# Patient Record
Sex: FEMALE | Race: WHITE | Employment: STUDENT | URBAN - METROPOLITAN AREA
[De-identification: names, ages, dates, MRNs, and addresses within clinical notes are randomized per-mention and may not be internally consistent; named-entity substitution may affect disease eponyms.]

---

## 2017-06-22 ENCOUNTER — HOSPITAL ENCOUNTER (OUTPATIENT)
Dept: PHYSICAL THERAPY | Age: 22
Discharge: HOME | End: 2017-06-22
Payer: COMMERCIAL

## 2017-06-22 PROCEDURE — 97162 PT EVAL MOD COMPLEX 30 MIN: CPT

## 2017-06-22 PROCEDURE — 97110 THERAPEUTIC EXERCISES: CPT

## 2017-06-22 NOTE — PROGRESS NOTES
Ambulatory/Rehab Services H2 Model Falls Risk Assessment    Risk Factor Pts. ·   Confusion/Disorientation/Impulsivity  []    4 ·   Symptomatic Depression  []   2 ·   Altered Elimination  []   1 ·   Dizziness/Vertigo  []   1 ·   Gender (Male)  []   1 ·   Any administered antiepileptics (anticonvulsants):  []   2 ·   Any administered benzodiazepines:  []   1 ·   Visual Impairment (specify):  []   1 ·   Portable Oxygen Use  []   1 ·   Orthostatic ? BP  []   1 ·   History of Recent Falls (within 3 mos.)  []   5     Ability to Rise from Chair (choose one) Pts. ·   Ability to rise in a single movement  []   0 ·   Pushes up, successful in one attempt  []   1 ·   Multiple attempts, but successful  []   3 ·   Unable to rise without assistance  []   4   Total: (5 or greater = High Risk) 0     Falls Prevention Plan:   []                Physical Limitations to Exercise (specify):   []                Mobility Assistance Device (type):   []                Exercise/Equipment Adaptation (specify):    ©2010 [facility] [address] Patent #[id number].  Federal Law prohibits the replication, distribution or use without written permission from [facility] [employer]

## 2017-06-22 NOTE — PROGRESS NOTES
Dell De La Rosa  : 1995 Therapy Center at [facility]  [address], [facility], [address]  [phone]   [phone]          OUTPATIENT PHYSICAL THERAPY:Initial Assessment 2017    ICD-10: Treatment Diagnosis: M54.5  Low back pain  M25.552  L hip pain  Precautions/Allergies:   Review of patient's allergies indicates no known allergies. Fall Risk Score: 0 (? 5 = High Risk)  MD Orders: Eval and treat MEDICAL/REFERRING DIAGNOSIS:  Pain in left hip [M25.552]   DATE OF ONSET: about 1 month ago  REFERRING PHYSICIAN: Ruba Davenport MD  RETURN PHYSICIAN APPOINTMENT: tomorrow     INITIAL ASSESSMENT:  Ms. Rajani Turcios presents with pain in the L hip and postural asymmetry that is consistent the a lateral shift in the lumbar spine. Her pain is not due to any specific trauma, but she is a competitive golfer. Xray was negative for any significant finding; pt has MRI scheduled for tomorrow. She responded well to initial PT treatment to correct postural alignment. She is expected to continue to improve with postural correction to allow stabilization, and progression to exercise program to restore normal posture, strength and motion, without pain. She is well motivated. PROBLEM LIST (Impacting functional limitations):  1. Decreased ADL/Functional Activities  2. Increased Pain  3. Decreased Activity Tolerance  4. Decreased Flexibility/Joint Mobility  5. Decreased [place] with Home Exercise Program  6. INTERVENTIONS PLANNED:  1. Home Exercise Program (HEP)  2. Manual Therapy  3. Range of Motion (ROM)  4. Therapeutic Exercise/Strengthening  5. Modalities as appropriate, including traction   TREATMENT PLAN:  Effective Dates: 2017 TO 9-. Frequency/Duration: 3 times a week for 1 week, then 2 x per week, and upon reassessment will adjust frequency and duration as progress indicates.     GOALS: (Goals have been discussed and agreed upon with patient.)    SHORT-TERM FUNCTIONAL GOALS: Time Frame: 4 weeks  Pt able to maintain good postural correction   Decrease pain by 3/10 to improve sitting and standing tolerance  [place] in HEP with minimal cueing, and aware of precautions for exercise  Be able to demonstrate correct sitting posture and protective body mechanics to minimize spinal stress  DISCHARGE GOALS: Time Frame: 12 weeks  Decrease pain to <3/10 to allow return to golf  Improve trunk AROM to WNL for bending & lifting ADL's  Improve score on modified Oswestry Index by > 5 points to allow progression to independent program  Progression to advanced HEP with no cueing to allow discharge from therapy. Rehabilitation Potential For Stated Goals: Good  Regarding [facility]'s therapy, I certify that the treatment plan above will be carried out by a therapist or under their direction. Thank you for this referral,  Delilah Orta PT     Referring Physician Signature: Queta Garcia MD              Date                    The information in this section was collected on 6- (except where otherwise noted). 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 Pain initially started on the R, with a familiar tightening of the lumbar paraspinals. Then pain moved to the L and has settled in the hip. She noticed that her trunk was out of alignment when the pain shifted to the hip. Nothing she has tried has helped the alignment or the pain. She reports her pain is constant, but she is painfree in lying down. Pain is made worse with moderate to prolonged sitting, rising, bending, in the am and as the day progresses. It is painful but less so with walking, or standing. Pain does not radiate below the buttock, and does not disturb her sleep.      Past Medical History/Comorbidities:   Ms. Duong Never  has no past medical history on file. Ms. Shima Valles  has a past surgical history that includes orthopaedic. She had L shoulder surgery about 7 yrs ago for a basketball injury. Social History/Living Environment:     lives with parents. Is sophomore at [employer],  Prior Level of Function/Work/Activity:  Full time student. On the golf team.  Active with exercise and sport  Dominant Side:         RIGHT  Personal Factors:          Age:  22 y.o. Current Medications:       Current Outpatient Prescriptions:     levalbuterol tartrate (XOPENEX HFA) 45 mcg/Actuation inhaler, Take  by inhalation. , Disp: , Rfl:    Date Last Reviewed:  6/22/2017     Number of Personal Factors/Comorbidities that affect the Plan of Care: 1-2: MODERATE COMPLEXITY   EXAMINATION:   Observation/Orthostatic Postural Assessment:          Pt is slightly round shouldered, with mildly forward head. In static standing shoulders are shifted to the L. Pt cannot correct posture in standing, nor can she maintain it if manually corrected. ROM:                Standing flexion= Moderately limited. Extn= Major limitation. Sideglide R= Major limitation. Sideglide L= nil limitation  Strength:          Weakness L DF and EHL -(4/5)  Compared to R  (5/5)  Neurological Screen:        Neural Tension Tests:  Positive on L        Sensation=  NT   Pt reports she had some tingling in L thigh about 3 weeks ago, but it has gone       Body Structures Involved:  1. Nerves  2. Bones  3. Joints  4. Muscles  5. Ligaments Body Functions Affected:  1. Sensory/Pain  2. Neuromusculoskeletal  3. Movement Related Activities and Participation Affected:  1. General Tasks and Demands  2. Mobility  3. Self Care  4. Domestic Life  5. Interpersonal Interactions and Relationships  6.  Community, Social and [place]   Number of elements that affect the Plan of Care: 3: MODERATE COMPLEXITY   CLINICAL PRESENTATION:   Presentation: Evolving clinical presentation with changing clinical characteristics: MODERATE COMPLEXITY   CLINICAL DECISION MAKING:   Outcome Measure: Tool Used: Modified Oswestry Low Back Pain Questionnaire  Score:  Initial: 26/50  Most Recent: X/50 (Date: -- )   Interpretation of Score: Each section is scored on a 0-5 scale, 5 representing the greatest disability. The scores of each section are added together for a total score of 50. Score 0 1-10 11-20 21-30 31-40 41-49 50   Modifier CH CI CJ CK CL CM CN     Medical Necessity:   · Skilled intervention continues to be required due to low back and hip pain that interferes with mobility, comfort, leisure. Reason for Services/Other Comments:  · Patient continues to require skilled intervention due to the need for specific exercise progression to restore normal alignment and promote return to previous functional level. .   Use of outcome tool(s) and clinical judgement create a POC that gives a: Questionable prediction of patient's progress: MODERATE COMPLEXITY            TREATMENT:   (In addition to Assessment/Re-Assessment sessions the following treatments were rendered)  Pre-treatment Symptoms/Complaints:  Pt has pain in the L hip, and her trunk is out of alignment and she can't correct it. Pain: Initial:  Pain Intensity 1: 5  Post Session:  4     Evaluation  (X)    THERAPEUTIC EXERCISE: (20 minutes):  Exercises  to improve mobility, strength and coordination. Required verbal and physical  cues to promote proper body alignment. Progressed resistance, range and repetitions as indicated. Pt worked on correction of lateral shift. Had manual correction in standing, with manual correction by therapist.  Was able to correct alignment but pt not able to sustain it. Worked on self correction leaning into the wall. .  Also tried self correction with pt overpressure but this is not effective for her to reduce the lateral sift.   Did prone exnt with hips off centre, to the L---pt able to eventually get elbows fully extended, and add overpressure. No pain in the hip with this exercise. Returned to standing and had pt doing lateral shift in doorway, to stabilize shoulders. This is the most effective way for her. She was able to get to slightly over-corrected posture, but still not able to maintain correction. After several sets in door way, lateral shift was visibly better but not fully corrected. Retested MMT following lateral shift ex--L DF improve 1 grade. Treatment/Session Assessment:  Pt tolerated treatment well today. She has been frustrated by the lack of change in her hip with time and with chiropractic, and seems to think now that the hip is not the source of the pain. She was pleased to see the decrease in pain and the change in her posture and DF strength after lots of repeated reps of L sideglide. She is very well motivated to be able to return to golf. Is expected to continue to improve, to benefit from PT.     · Response to Treatment:  Decrease in pain and decrease in the severity of the L lateral shift. · Compliance with Program/Exercises: Will assess as treatment progresses. · Recommendations/Intent for next treatment session:   Next visit will focus on decreasing pain, improving AROM,  Strength, and movement patterns, to restore optimal/normal function.     Total Treatment Duration:   60 min    PT Patient Time In/Time Out  Time In: 0100  Time Out: 0200    Total number of treatments:   MILNEA Roca

## 2017-06-26 ENCOUNTER — HOSPITAL ENCOUNTER (OUTPATIENT)
Dept: PHYSICAL THERAPY | Age: 22
Discharge: HOME | End: 2017-06-26
Payer: COMMERCIAL

## 2017-06-26 PROCEDURE — 97110 THERAPEUTIC EXERCISES: CPT

## 2017-06-26 PROCEDURE — 97012 MECHANICAL TRACTION THERAPY: CPT

## 2017-06-26 NOTE — PROGRESS NOTES
Martha Lindo  : 1995 Therapy Center at [facility] Therapy  [address], [facility], [address]  [phone]   [id number] [id number]          OUTPATIENT PHYSICAL THERAPY: DAILY NOTE  2017    ICD-10: Treatment Diagnosis: M54.5  Low back pain  M25.552  L hip pain  Precautions/Allergies:   Review of patient's allergies indicates no known allergies. Fall Risk Score: 0 (? 5 = High Risk)  MD Orders: Eval and treat MEDICAL/REFERRING DIAGNOSIS:  Pain in left hip [M25.552]   DATE OF ONSET: about 1 month ago  REFERRING PHYSICIAN: Madeline Martínez MD  RETURN PHYSICIAN APPOINTMENT: tomorrow     INITIAL ASSESSMENT:  Ms. Donte Das presents with pain in the L hip and postural asymmetry that is consistent the a lateral shift in the lumbar spine. Her pain is not due to any specific trauma, but she is a competitive golfer. Xray was negative for any significant finding; pt has MRI scheduled for tomorrow. She responded well to initial PT treatment to correct postural alignment. She is expected to continue to improve with postural correction to allow stabilization, and progression to exercise program to restore normal posture, strength and motion, without pain. She is well motivated. PROBLEM LIST (Impacting functional limitations):  1. Decreased ADL/Functional Activities  2. Increased Pain  3. Decreased Activity Tolerance  4. Decreased Flexibility/Joint Mobility  5. Decreased [place] with Home Exercise Program  6. INTERVENTIONS PLANNED:  1. Home Exercise Program (HEP)  2. Manual Therapy  3. Range of Motion (ROM)  4. Therapeutic Exercise/Strengthening  5. Modalities as appropriate, including traction   TREATMENT PLAN:  Effective Dates: 2017 TO 9-. Frequency/Duration: 3 times a week for 1 week, then 2 x per week, and upon reassessment will adjust frequency and duration as progress indicates.     GOALS: (Goals have been discussed and agreed upon with patient.)    SHORT-TERM FUNCTIONAL GOALS: Time Frame: 4 weeks  Pt able to maintain good postural correction   Decrease pain by 3/10 to improve sitting and standing tolerance  [place] in HEP with minimal cueing, and aware of precautions for exercise  Be able to demonstrate correct sitting posture and protective body mechanics to minimize spinal stress  DISCHARGE GOALS: Time Frame: 12 weeks  Decrease pain to <3/10 to allow return to golf  Improve trunk AROM to WNL for bending & lifting ADL's  Improve score on modified Oswestry Index by > 5 points to allow progression to independent program  Progression to advanced HEP with no cueing to allow discharge from therapy. Rehabilitation Potential For Stated Goals: Good  Regarding [facility]'s therapy, I certify that the treatment plan above will be carried out by a therapist or under their direction. Thank you for this referral,  Scotty Soni PT     Referring Physician Signature: Ye Joseph MD              Date                    The information in this section was collected on 6- (except where otherwise noted). 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 Pain initially started on the R, with a familiar tightening of the lumbar paraspinals. Then pain moved to the L and has settled in the hip. She noticed that her trunk was out of alignment when the pain shifted to the hip. Nothing she has tried has helped the alignment or the pain. She reports her pain is constant, but she is painfree in lying down. Pain is made worse with moderate to prolonged sitting, rising, bending, in the am and as the day progresses. It is painful but less so with walking, or standing. Pain does not radiate below the buttock, and does not disturb her sleep.      Past Medical History/Comorbidities:   Ms. Li Lu  has no past medical history on file. Ms. Arely Raza  has a past surgical history that includes orthopaedic. She had L shoulder surgery about 7 yrs ago for a basketball injury. Social History/Living Environment:     lives with parents. 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 Current Medications:       Current Outpatient Prescriptions:     levalbuterol tartrate (XOPENEX HFA) 45 mcg/Actuation inhaler, Take  by inhalation. , Disp: , Rfl:    Date Last Reviewed:  6/26/2017     Number of Personal Factors/Comorbidities that affect the Plan of Care: 1-2: MODERATE COMPLEXITY   EXAMINATION:   Observation/Orthostatic Postural Assessment:          Pt is slightly round shouldered, with mildly forward head. In static standing shoulders are shifted to the L. Pt cannot correct posture in standing, nor can she maintain it if manually corrected. ROM:                Standing flexion= Moderately limited. Extn= Major limitation. Sideglide R= Major limitation. Sideglide L= nil limitation  Strength:          Weakness L DF and EHL -(4/5)  Compared to R  (5/5)  Neurological Screen:        Neural Tension Tests:  Positive on L        Sensation=  NT   Pt reports she had some tingling in L thigh about 3 weeks ago, but it has gone       Body Structures Involved:  1. Nerves  2. Bones  3. Joints  4. Muscles  5. Ligaments Body Functions Affected:  1. Sensory/Pain  2. Neuromusculoskeletal  3. Movement Related Activities and Participation Affected:  1. General Tasks and Demands  2. Mobility  3. Self Care  4. Domestic Life  5. Interpersonal Interactions and Relationships  6.  Community, Social and [place]   Number of elements that affect the Plan of Care: 3: MODERATE COMPLEXITY   CLINICAL PRESENTATION:   Presentation: Evolving clinical presentation with changing clinical characteristics: MODERATE COMPLEXITY   CLINICAL DECISION MAKING:   Outcome Measure: Tool Used: Modified Oswestry Low Back Pain Questionnaire  Score:  Initial: 26/50  Most Recent: X/50 (Date: -- )   Interpretation of Score: Each section is scored on a 0-5 scale, 5 representing the greatest disability. The scores of each section are added together for a total score of 50. Score 0 1-10 11-20 21-30 31-40 41-49 50   Modifier CH CI CJ CK CL CM CN     Medical Necessity:   · Skilled intervention continues to be required due to low back and hip pain that interferes with mobility, comfort, leisure. Reason for Services/Other Comments:  · Patient continues to require skilled intervention due to the need for specific exercise progression to restore normal alignment and promote return to previous functional level. .   Use of outcome tool(s) and clinical judgement create a POC that gives a: Questionable prediction of patient's progress: MODERATE COMPLEXITY            TREATMENT:   (In addition to Assessment/Re-Assessment sessions the following treatments were rendered)    Pre-treatment Symptoms/Complaints:  Pt had MRI last week and got results back this morning. Imaging confirmed herniated disc at L5, and bulges at L4 and L3. She is scheduled to get cortisone injection later this week. She reported that she has been compliant with lateral shift exercises, and she may be a little bit better. Pain: Initial:  Pain Intensity 1: 4  Post Session:  3         THERAPEUTIC EXERCISE: (45 minutes):  Exercises  to improve mobility, strength and coordination. Required verbal and physical  cues to promote proper body alignment. Progressed resistance, range and repetitions as indicated. Pt worked on lots of correction of lateral shift. Had manual correction in standing, with manual correction by therapist.  Was able to correct alignment but pt not able to sustain it.    Did prone exnt with hips off centre, to the R---pt able to eventually get elbows fully extended, and add overpressure. No pain in the hip with this exercise. Returned to standing and had pt doing lateral shift in doorway, to stabilize shoulders. This is the most effective way for her. She was able to get to slightly over-corrected posture, but still not able to maintain correction. alternated between sets of manual overcorrection by therapist in standing, and prone press ups, now with hips centered. Pt able to get good extn range without sx in prone. By the time she left she could stand and walk with much better symmetry. Left HEP the same--lateral shift in doorway, followed by prone press ups        Modalities  (15 min): Intermittent lumbar traction at 75#, x 15 min, to provide axial stretching and lumbar decompression    Treatment/Session Assessment:  Pt tolerated treatment well today. She remains frustrated by the lack of change in her hip with time and with chiropractic. Was going to try inversion table today following PT. She was pleased to see the decrease in pain and the improvement in her posture after lots of repeated reps of L sideglide. She is very well motivated to be able to return to golf. Is expected to continue to improve, to benefit from PT.     · Response to Treatment:  Decrease in pain and decrease in the severity of the L lateral shift. · Compliance with Program/Exercises: Will assess as treatment progresses. · Recommendations/Intent for next treatment session:   Next visit will focus on decreasing pain, improving AROM,  Strength, and movement patterns, to restore optimal/normal function.     Total Treatment Duration:   60 min    PT Patient Time In/Time Out  Time In: 0400  Time Out: 0500    Total number of treatments:   2    Meli Dougherty, PT

## 2017-06-28 ENCOUNTER — HOSPITAL ENCOUNTER (OUTPATIENT)
Dept: PHYSICAL THERAPY | Age: 22
Discharge: HOME | End: 2017-06-28
Payer: COMMERCIAL

## 2017-06-28 PROCEDURE — 97110 THERAPEUTIC EXERCISES: CPT

## 2017-06-28 NOTE — PROGRESS NOTES
Ysabel Rashad  : 1995 Therapy [facility] at [facility]  [address], [facility], [address]  [phone]   [phone]          OUTPATIENT PHYSICAL THERAPY: DAILY NOTE  2017    ICD-10: Treatment Diagnosis: M54.5  Low back pain  M25.552  L hip pain  Precautions/Allergies:   Review of patient's allergies indicates no known allergies. Fall Risk Score: 0 (? 5 = High Risk)  MD Orders: Eval and treat MEDICAL/REFERRING DIAGNOSIS:  Pain in left hip [M25.552]   DATE OF ONSET: about 1 month ago  REFERRING PHYSICIAN: Sunita Clements MD  RETURN PHYSICIAN APPOINTMENT: tomorrow     INITIAL ASSESSMENT:  Ms. Bharat Conner presents with pain in the L hip and postural asymmetry that is consistent the a lateral shift in the lumbar spine. Her pain is not due to any specific trauma, but she is a competitive golfer. Xray was negative for any significant finding; pt has MRI scheduled for tomorrow. She responded well to initial PT treatment to correct postural alignment. She is expected to continue to improve with postural correction to allow stabilization, and progression to exercise program to restore normal posture, strength and motion, without pain. She is well motivated. PROBLEM LIST (Impacting functional limitations):  1. Decreased ADL/Functional Activities  2. Increased Pain  3. Decreased Activity Tolerance  4. Decreased Flexibility/Joint Mobility  5. Decreased [place] with Home Exercise Program  6. INTERVENTIONS PLANNED:  1. Home Exercise Program (HEP)  2. Manual Therapy  3. Range of Motion (ROM)  4. Therapeutic Exercise/Strengthening  5. Modalities as appropriate, including traction   TREATMENT PLAN:  Effective Dates: 2017 TO 9-. Frequency/Duration: 3 times a week for 1 week, then 2 x per week, and upon reassessment will adjust frequency and duration as progress indicates.     GOALS: (Goals have been discussed and agreed upon with patient.)    SHORT-TERM FUNCTIONAL GOALS: Time Frame: 4 weeks  Pt able to maintain good postural correction   Decrease pain by 3/10 to improve sitting and standing tolerance  [place] in HEP with minimal cueing, and aware of precautions for exercise  Be able to demonstrate correct sitting posture and protective body mechanics to minimize spinal stress  DISCHARGE GOALS: Time Frame: 12 weeks  Decrease pain to <3/10 to allow return to golf  Improve trunk AROM to WNL for bending & lifting ADL's  Improve score on modified Oswestry Index by > 5 points to allow progression to independent program  Progression to advanced HEP with no cueing to allow discharge from therapy. Rehabilitation Potential For Stated Goals: Good  Regarding [facility]'s therapy, I certify that the treatment plan above will be carried out by a therapist or under their direction. Thank you for this referral,  Lamar Renee, PT     Referring Physician Signature: Trish Pickett MD              Date                    The information in this section was collected on 6- (except where otherwise noted). 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 Pain initially started on the R, with a familiar tightening of the lumbar paraspinals. Then pain moved to the L and has settled in the hip. She noticed that her trunk was out of alignment when the pain shifted to the hip. Nothing she has tried has helped the alignment or the pain. She reports her pain is constant, but she is painfree in lying down. Pain is made worse with moderate to prolonged sitting, rising, bending, in the am and as the day progresses. It is painful but less so with walking, or standing. Pain does not radiate below the buttock, and does not disturb her sleep.      Past Medical History/Comorbidities:   Ms. Nyasia Moseley  has no past medical history on file. Ms. Stefany Stewart  has a past surgical history that includes orthopaedic. She had L shoulder surgery about 7 yrs ago for a basketball injury. Social History/Living Environment:     lives with parents. Is sophomore at [employer],  Prior Level of Function/Work/Activity:  Full time student. On the golf team.  Active with exercise and sport  Dominant Side:         RIGHT  Personal Factors:          Age:  22 y.o. Current Medications:       Current Outpatient Prescriptions:     levalbuterol tartrate (XOPENEX HFA) 45 mcg/Actuation inhaler, Take  by inhalation. , Disp: , Rfl:    Date Last Reviewed:  6/28/2017     Number of Personal Factors/Comorbidities that affect the Plan of Care: 1-2: MODERATE COMPLEXITY   EXAMINATION:   Observation/Orthostatic Postural Assessment:          Pt is slightly round shouldered, with mildly forward head. In static standing shoulders are shifted to the L. Pt cannot correct posture in standing, nor can she maintain it if manually corrected. ROM:                Standing flexion= Moderately limited. Extn= Major limitation. Sideglide R= Major limitation. Sideglide L= nil limitation  Strength:          Weakness L DF and EHL -(4/5)  Compared to R  (5/5)  Neurological Screen:        Neural Tension Tests:  Positive on L        Sensation=  NT   Pt reports she had some tingling in L thigh about 3 weeks ago, but it has gone       Body Structures Involved:  1. Nerves  2. Bones  3. Joints  4. Muscles  5. Ligaments Body Functions Affected:  1. Sensory/Pain  2. Neuromusculoskeletal  3. Movement Related Activities and Participation Affected:  1. General Tasks and Demands  2. Mobility  3. Self Care  4. Domestic Life  5. Interpersonal Interactions and Relationships  6.  Community, Social and [place]   Number of elements that affect the Plan of Care: 3: MODERATE COMPLEXITY   CLINICAL PRESENTATION:   Presentation: Evolving clinical presentation with changing clinical characteristics: MODERATE COMPLEXITY   CLINICAL DECISION MAKING:   Outcome Measure: Tool Used: Modified Oswestry Low Back Pain Questionnaire  Score:  Initial: 26/50  Most Recent: X/50 (Date: -- )   Interpretation of Score: Each section is scored on a 0-5 scale, 5 representing the greatest disability. The scores of each section are added together for a total score of 50. Score 0 1-10 11-20 21-30 31-40 41-49 50   Modifier CH CI CJ CK CL CM CN     Medical Necessity:   · Skilled intervention continues to be required due to low back and hip pain that interferes with mobility, comfort, leisure. Reason for Services/Other Comments:  · Patient continues to require skilled intervention due to the need for specific exercise progression to restore normal alignment and promote return to previous functional level. .   Use of outcome tool(s) and clinical judgement create a POC that gives a: Questionable prediction of patient's progress: MODERATE COMPLEXITY            TREATMENT:   (In addition to Assessment/Re-Assessment sessions the following treatments were rendered)    Pre-treatment Symptoms/Complaints:  Pt reported that she is feeling some better. She has been compliant with HEP, and can now get hips to the L. Is going for DEBORAH on 7-3. Pain: Initial:  Pain Intensity 1: 3  Post Session:  3         THERAPEUTIC EXERCISE: (60 minutes):  Exercises  to improve mobility, strength and coordination. Required verbal and physical  cues to promote proper body alignment. Progressed resistance, range and repetitions as indicated. Rechecked motion baselines--flexion has improved to minimal limitation. Exten still moderately to severely limited. Pt can now get to L sideglide without asst.  Pt worked on lots of correction of lateral shift. did manual correction in standing, with manual correction by therapist.  Was able to correct alignment with minimal pressure.      Did prone exnt ---pt able to get elbows fully extended, and add overpressure. No pain in the hip with this exercise. alternated between sets of manual overcorrection by therapist in standing, and prone press ups. Pt able to get good extn range without sx in prone. By the time she left she could stand and walk with much better symmetry. Left HEP the same--lateral shift in doorway, followed by prone press ups    Visual feedback for walking in the glass--pt able to maintain almost symmetrical spine following manual correction  Pt able to single leg stand without compensation or increase in pain  After treatment pt reported no pain in the L leg or buttock --low back pain was centralised    Modalities  ( min):      Treatment/Session Assessment:  Pt tolerated treatment well today. She was pleased to see the decrease in pain and the improvement in her posture after lots of repeated reps of L sideglide. Her active standing motion is much better. She is very well motivated to be able to return to golf. Is expected to continue to improve, to benefit from PT. She gets DEBORAH on 7-3. · Response to Treatment:  Decrease in pain and much improved static standing posture  · Compliance with Program/Exercises: Will assess as treatment progresses. · Recommendations/Intent for next treatment session:   Next visit will focus on decreasing pain, improving AROM,  Strength, and movement patterns, to restore optimal/normal function.     Total Treatment Duration:   60 min    PT Patient Time In/Time Out  Time In: 1055  Time Out: 1155    Total number of treatments:  [address], PT

## 2017-06-29 ENCOUNTER — HOSPITAL ENCOUNTER (OUTPATIENT)
Dept: PHYSICAL THERAPY | Age: 22
Discharge: HOME | End: 2017-06-29
Payer: COMMERCIAL

## 2017-06-29 PROCEDURE — 97110 THERAPEUTIC EXERCISES: CPT

## 2017-06-29 PROCEDURE — 97012 MECHANICAL TRACTION THERAPY: CPT

## 2017-06-29 NOTE — PROGRESS NOTES
Michelle Perez  : 1995 Therapy [facility] at [facility]  [address], [facility], [address]  [phone]   [phone]          OUTPATIENT PHYSICAL THERAPY: DAILY NOTE  2017    ICD-10: Treatment Diagnosis: M54.5  Low back pain  M25.552  L hip pain  Precautions/Allergies:   Review of patient's allergies indicates no known allergies. Fall Risk Score: 0 (? 5 = High Risk)  MD Orders: Eval and treat MEDICAL/REFERRING DIAGNOSIS:  Pain in left hip [M25.552]   DATE OF ONSET: about 1 month ago  REFERRING PHYSICIAN: Trish Pickett MD  RETURN PHYSICIAN APPOINTMENT: tomorrow     INITIAL ASSESSMENT:  Ms. Nyasia Moseley presents with pain in the L hip and postural asymmetry that is consistent the a lateral shift in the lumbar spine. Her pain is not due to any specific trauma, but she is a competitive golfer. Xray was negative for any significant finding; pt has MRI scheduled for tomorrow. She responded well to initial PT treatment to correct postural alignment. She is expected to continue to improve with postural correction to allow stabilization, and progression to exercise program to restore normal posture, strength and motion, without pain. She is well motivated. PROBLEM LIST (Impacting functional limitations):  1. Decreased ADL/Functional Activities  2. Increased Pain  3. Decreased Activity Tolerance  4. Decreased Flexibility/Joint Mobility  5. Decreased [place] with Home Exercise Program  6. INTERVENTIONS PLANNED:  1. Home Exercise Program (HEP)  2. Manual Therapy  3. Range of Motion (ROM)  4. Therapeutic Exercise/Strengthening  5. Modalities as appropriate, including traction   TREATMENT PLAN:  Effective Dates: 2017 TO 9-. Frequency/Duration: 3 times a week for 1 week, then 2 x per week, and upon reassessment will adjust frequency and duration as progress indicates.     GOALS: (Goals have been discussed and agreed upon with patient.)    SHORT-TERM FUNCTIONAL GOALS: Time Frame: 4 weeks  Pt able to maintain good postural correction   Decrease pain by 3/10 to improve sitting and standing tolerance  [place] in HEP with minimal cueing, and aware of precautions for exercise  Be able to demonstrate correct sitting posture and protective body mechanics to minimize spinal stress  DISCHARGE GOALS: Time Frame: 12 weeks  Decrease pain to <3/10 to allow return to golf  Improve trunk AROM to WNL for bending & lifting ADL's  Improve score on modified Oswestry Index by > 5 points to allow progression to independent program  Progression to advanced HEP with no cueing to allow discharge from therapy. Rehabilitation Potential For Stated Goals: Good  Regarding [facility], I certify that the treatment plan above will be carried out by a therapist or under their direction. Thank you for this referral,  Willow Grant PT     Referring Physician Signature: Elroy Ramirez MD              Date                    The information in this section was collected on 6- (except where otherwise noted). 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 Pain initially started on the R, with a familiar tightening of the lumbar paraspinals. Then pain moved to the L and has settled in the hip. She noticed that her trunk was out of alignment when the pain shifted to the hip. Nothing she has tried has helped the alignment or the pain. She reports her pain is constant, but she is painfree in lying down. Pain is made worse with moderate to prolonged sitting, rising, bending, in the am and as the day progresses. It is painful but less so with walking, or standing. Pain does not radiate below the buttock, and does not disturb her sleep.      Past Medical History/Comorbidities:   Ms. Mely Israel  has no past medical history on file. Ms. Carlota Castillo  has a past surgical history that includes orthopaedic. She had L shoulder surgery about 7 yrs ago for a basketball injury. Social History/Living Environment:     lives with parents. Is sophomore at [employer],  Prior Level of Function/Work/Activity:  Full time student. On the golf team.  Active with exercise and sport  Dominant Side:         RIGHT  Personal Factors:          Age:  22 y.o. Current Medications:       Current Outpatient Prescriptions:     levalbuterol tartrate (XOPENEX HFA) 45 mcg/Actuation inhaler, Take  by inhalation. , Disp: , Rfl:    Date Last Reviewed:  6/29/2017     Number of Personal Factors/Comorbidities that affect the Plan of Care: 1-2: MODERATE COMPLEXITY   EXAMINATION:   Observation/Orthostatic Postural Assessment:          Pt is slightly round shouldered, with mildly forward head. In static standing shoulders are shifted to the L. Pt cannot correct posture in standing, nor can she maintain it if manually corrected. ROM:                Standing flexion= Moderately limited. Extn= Major limitation. Sideglide R= Major limitation. Sideglide L= nil limitation  Strength:          Weakness L DF and EHL -(4/5)  Compared to R  (5/5)  Neurological Screen:        Neural Tension Tests:  Positive on L        Sensation=  NT   Pt reports she had some tingling in L thigh about 3 weeks ago, but it has gone       Body Structures Involved:  1. Nerves  2. Bones  3. Joints  4. Muscles  5. Ligaments Body Functions Affected:  1. Sensory/Pain  2. Neuromusculoskeletal  3. Movement Related Activities and Participation Affected:  1. General Tasks and Demands  2. Mobility  3. Self Care  4. Domestic Life  5. Interpersonal Interactions and Relationships  6.  Community, Social and [place]   Number of elements that affect the Plan of Care: 3: MODERATE COMPLEXITY   CLINICAL PRESENTATION:   Presentation: Evolving clinical presentation with changing clinical characteristics: MODERATE COMPLEXITY   CLINICAL DECISION MAKING:   Outcome Measure: Tool Used: Modified Oswestry Low Back Pain Questionnaire  Score:  Initial: 26/50  Most Recent: X/50 (Date: -- )   Interpretation of Score: Each section is scored on a 0-5 scale, 5 representing the greatest disability. The scores of each section are added together for a total score of 50. Score 0 1-10 11-20 21-30 31-40 41-49 50   Modifier CH CI CJ CK CL CM CN     Medical Necessity:   · Skilled intervention continues to be required due to low back and hip pain that interferes with mobility, comfort, leisure. Reason for Services/Other Comments:  · Patient continues to require skilled intervention due to the need for specific exercise progression to restore normal alignment and promote return to previous functional level. .   Use of outcome tool(s) and clinical judgement create a POC that gives a: Questionable prediction of patient's progress: MODERATE COMPLEXITY            TREATMENT:   (In addition to Assessment/Re-Assessment sessions the following treatments were rendered)    Pre-treatment Symptoms/Complaints:  Pt reported that she is feeling better. She can tell that her range for sideglide is better but she still can't maintain it for long. She has been compliant with HEP, and can now get hips to the L. Is going for DEBORAH on 7-3. Pain: Initial:  Pain Intensity 1: 3  Post Session:  3- > 2         THERAPEUTIC EXERCISE: (30 minutes):  Exercises  to improve mobility, strength and coordination. Required verbal and physical  cues to promote proper body alignment. Progressed resistance, range and repetitions as indicated. Rechecked motion baselines--flexion has improved to minimal limitation. Exten still moderately limited. Pt can now get to L sideglide without asst.  Pt worked on lots of manual correction of lateral shift.   did manual correction in standing, with manual correction by therapist.  Was able to correct alignment with minimal pressure and could get to end range L sideglide without pain. Did prone exnt ---pt able to get elbows fully extended, and add overpressure. No pain in the hip with this exercise. Did sustained extn in prone--3 x 1 min holds     By the time she left she could stand and walk with much better symmetry. Left HEP the same--lateral shift in doorway, followed by prone press ups    Visual feedback for walking in the glass--pt able to maintain almost symmetrical spine following manual correction  After treatment pt reported no pain in the L leg or buttock --low back pain was centralised. Only pain is in anterior L hip    Modalities  ( 15 min): Intermittent lumbar traction--82#--x 15 min    Treatment/Session Assessment:  Pt tolerated treatment well today. She was pleased to see the decrease in pain and the improvement in her posture after lots of repeated reps of L sideglide. Her active standing motion is much better. She reports compliance with sideglide ex. She is very well motivated to be able to return to golf. Is expected to continue to improve, to benefit from PT. She is also receiving traction at chiropractor, and uses inversion table some. She gets DEBORAH on 7-3. · Response to Treatment:  Decrease in pain and much improved static standing posture  · Compliance with Program/Exercises: Will assess as treatment progresses. · Recommendations/Intent for next treatment session:   Next visit will focus on decreasing pain, improving AROM,  Strength, and movement patterns, to restore optimal/normal function.     Total Treatment Duration:   45 min    PT Patient Time In/Time Out  Time In: 1015  Time Out: 1100    Total number of treatments:  [facility]

## 2017-07-03 ENCOUNTER — HOSPITAL ENCOUNTER (OUTPATIENT)
Dept: PHYSICAL THERAPY | Age: 22
Discharge: HOME | End: 2017-07-03
Payer: COMMERCIAL

## 2017-07-03 NOTE — PROGRESS NOTES
Denia Hernandez  : 1995 Therapy [facility] at [facility]  [address], [facility] [address]  [phone]   [phone]        OUTPATIENT DAILY NOTE    NAME/AGE/GENDER: Denia Hernandez is a 22 y.o. female. DATE: 7/3/2017    Patient had to cancel appointment today due to a conflict with her DEBORAH. Will plan to follow up on next scheduled visit later this week.     Arvin Norris, PT

## 2017-07-05 ENCOUNTER — HOSPITAL ENCOUNTER (OUTPATIENT)
Dept: PHYSICAL THERAPY | Age: 22
Discharge: HOME | End: 2017-07-05
Payer: COMMERCIAL

## 2017-07-05 PROCEDURE — 97012 MECHANICAL TRACTION THERAPY: CPT

## 2017-07-05 PROCEDURE — 97110 THERAPEUTIC EXERCISES: CPT

## 2017-07-05 NOTE — PROGRESS NOTES
Soha Gordillo  : 1995 [facility] at [facility]  [address], [address]  [phone]   [phone]          OUTPATIENT PHYSICAL THERAPY: DAILY NOTE  2017    ICD-10: Treatment Diagnosis: M54.5  Low back pain  M25.552  L hip pain  Precautions/Allergies:   Review of patient's allergies indicates no known allergies. Fall Risk Score: 0 (? 5 = High Risk)  MD Orders: Eval and treat MEDICAL/REFERRING DIAGNOSIS:  Pain in left hip [M25.552]   DATE OF ONSET: about 1 month ago  REFERRING PHYSICIAN: Elroy Ramirez MD  RETURN PHYSICIAN APPOINTMENT: tomorrow     INITIAL ASSESSMENT:  Ms. Mely Israel presents with pain in the L hip and postural asymmetry that is consistent the a lateral shift in the lumbar spine. Her pain is not due to any specific trauma, but she is a competitive golfer. Xray was negative for any significant finding; pt has MRI scheduled for tomorrow. She responded well to initial PT treatment to correct postural alignment. She is expected to continue to improve with postural correction to allow stabilization, and progression to exercise program to restore normal posture, strength and motion, without pain. She is well motivated. PROBLEM LIST (Impacting functional limitations):  1. Decreased ADL/Functional Activities  2. Increased Pain  3. Decreased Activity Tolerance  4. Decreased Flexibility/Joint Mobility  5. Decreased [place] with Home Exercise Program  6. INTERVENTIONS PLANNED:  1. Home Exercise Program (HEP)  2. Manual Therapy  3. Range of Motion (ROM)  4. Therapeutic Exercise/Strengthening  5. Modalities as appropriate, including traction   TREATMENT PLAN:  Effective Dates: 2017 TO 9-. Frequency/Duration: 3 times a week for 1 week, then 2 x per week, and upon reassessment will adjust frequency and duration as progress indicates.     GOALS: (Goals have been discussed and agreed upon with patient.)    SHORT-TERM FUNCTIONAL GOALS: Time Frame: 4 weeks  Pt able to maintain good postural correction   Decrease pain by 3/10 to improve sitting and standing tolerance  [place] in HEP with minimal cueing, and aware of precautions for exercise  Be able to demonstrate correct sitting posture and protective body mechanics to minimize spinal stress  DISCHARGE GOALS: Time Frame: 12 weeks  Decrease pain to <3/10 to allow return to golf  Improve trunk AROM to WNL for bending & lifting ADL's  Improve score on modified Oswestry Index by > 5 points to allow progression to independent program  Progression to advanced HEP with no cueing to allow discharge from therapy. Rehabilitation Potential For Stated Goals: Good  Regarding [facility] [place]'s therapy, I certify that the treatment plan above will be carried out by a therapist or under their direction. Thank you for this referral,  Samy Chapa PT     Referring Physician Signature: Carlos Ram MD              Date                    The information in this section was collected on 6- (except where otherwise noted). 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 Pain initially started on the R, with a familiar tightening of the lumbar paraspinals. Then pain moved to the L and has settled in the hip. She noticed that her trunk was out of alignment when the pain shifted to the hip. Nothing she has tried has helped the alignment or the pain. She reports her pain is constant, but she is painfree in lying down. Pain is made worse with moderate to prolonged sitting, rising, bending, in the am and as the day progresses. It is painful but less so with walking, or standing. Pain does not radiate below the buttock, and does not disturb her sleep.      Past Medical History/Comorbidities:   Ms. Kirstin Nicolas  has no past medical history on file. Ms. Laureano Muñoz  has a past surgical history that includes orthopaedic. She had L shoulder surgery about 7 yrs ago for a basketball injury. Social History/Living Environment:     lives with parents. Is sophomore at [employer],  Prior Level of Function/Work/Activity:  Full time student. On the golf team.  Active with exercise and sport  Dominant Side:         RIGHT  Personal Factors:          Age:  22 y.o. Current Medications:       Current Outpatient Prescriptions:     levalbuterol tartrate (XOPENEX HFA) 45 mcg/Actuation inhaler, Take  by inhalation. , Disp: , Rfl:    Date Last Reviewed:  7/5/2017     Number of Personal Factors/Comorbidities that affect the Plan of Care: 1-2: MODERATE COMPLEXITY   EXAMINATION:   Observation/Orthostatic Postural Assessment:          Pt is slightly round shouldered, with mildly forward head. In static standing shoulders are shifted to the L. Pt cannot correct posture in standing, nor can she maintain it if manually corrected. ROM:                Standing flexion= Moderately limited. Extn= Major limitation. Sideglide R= Major limitation. Sideglide L= nil limitation  Strength:          Weakness L DF and EHL -(4/5)  Compared to R  (5/5)  Neurological Screen:        Neural Tension Tests:  Positive on L        Sensation=  NT   Pt reports she had some tingling in L thigh about 3 weeks ago, but it has gone       Body Structures Involved:  1. Nerves  2. Bones  3. Joints  4. Muscles  5. Ligaments Body Functions Affected:  1. Sensory/Pain  2. Neuromusculoskeletal  3. Movement Related Activities and Participation Affected:  1. General Tasks and Demands  2. Mobility  3. Self Care  4. Domestic Life  5. Interpersonal Interactions and Relationships  6.  Community, Social and [place]   Number of elements that affect the Plan of Care: 3: MODERATE COMPLEXITY   CLINICAL PRESENTATION:   Presentation: Evolving clinical presentation with changing clinical characteristics: MODERATE COMPLEXITY   CLINICAL DECISION MAKING:   Outcome Measure: Tool Used: Modified Oswestry Low Back Pain Questionnaire  Score:  Initial: 26/50  Most Recent: X/50 (Date: -- )   Interpretation of Score: Each section is scored on a 0-5 scale, 5 representing the greatest disability. The scores of each section are added together for a total score of 50. Score 0 1-10 11-20 21-30 31-40 41-49 50   Modifier CH CI CJ CK CL CM CN     Medical Necessity:   · Skilled intervention continues to be required due to low back and hip pain that interferes with mobility, comfort, leisure. Reason for Services/Other Comments:  · Patient continues to require skilled intervention due to the need for specific exercise progression to restore normal alignment and promote return to previous functional level. .   Use of outcome tool(s) and clinical judgement create a POC that gives a: Questionable prediction of patient's progress: MODERATE COMPLEXITY            TREATMENT:   (In addition to Assessment/Re-Assessment sessions the following treatments were rendered)    Pre-treatment Symptoms/Complaints:  Pt reported that she is feeling better. Pt had DEBORAH on 7-3. Not sure if she can tell a real difference yet. She can tell that her range for sideglide is better but she still can't maintain it for long. She has been compliant with HEP, and can now get hips to the L.   Pain: Initial:  Pain Intensity 1: 3  Post Session:  3- > 2         THERAPEUTIC EXERCISE: (40 minutes):  Exercises  to improve mobility, strength and coordination. Required verbal and physical  cues to promote proper body alignment. Progressed resistance, range and repetitions as indicated. Rechecked motion baselines--flexion has improved to minimal limitation. Exten still moderately limited. Pt can now get to L sideglide without asst.  Sideglide to R is minimally limited. Pt worked on lots of manual correction of lateral shift.   This is much easier now, with no palpable splinting or guarding. Pt can easily get to full range L sideglide without pain. Did A/P mobs for lumbar spine, to allow for better lumbar extn with pt- generated forces in prone. Did prone exnt ---pt able to get elbows fully extended, and add overpressure. No pain in the hip with this exercise. By the time she left she could stand and walk with much better symmetry. Left HEP the same--lateral shift in doorway, followed by prone press ups    Visual feedback for walking in the glass--pt able to maintain almost symmetrical spine following manual correction  After treatment pt reported no pain in the L leg or buttock -- pain is anterior hip and laterally, above the buttock      Modalities  ( 20 min): Intermittent lumbar traction--82#--x 20 min. Traction to decompress spine, provide axial stretch    Treatment/Session Assessment:  Pt tolerated treatment well today. She was pleased with the decrease in pain and the improvement in her posture. Her active standing motion is much better. She reports compliance with sideglide ex. She reports that it feels like the traction is helping. And she notes that it is much easier for her to do her sideglide exercises. She is very well motivated to be able to return to golf. Is expected to continue to improve, to benefit from PT. She is also receiving traction at chiropractor. · Response to Treatment:  Decrease in pain and much improved static standing posture. Gait is with much better pace, and posture. · Compliance with Program/Exercises: Will assess as treatment progresses. · Recommendations/Intent for next treatment session:   Next visit will focus on decreasing pain, improving AROM,  Strength, and movement patterns, to restore optimal/normal function.     Total Treatment Duration:   60 min    PT Patient Time In/Time Out  Time In: 0200  Time Out: 0300    Total number of treatments:  8599 President , PT

## 2017-07-10 ENCOUNTER — HOSPITAL ENCOUNTER (OUTPATIENT)
Dept: PHYSICAL THERAPY | Age: 22
Discharge: HOME | End: 2017-07-10
Payer: COMMERCIAL

## 2017-07-10 PROCEDURE — 97110 THERAPEUTIC EXERCISES: CPT

## 2017-07-10 PROCEDURE — 97012 MECHANICAL TRACTION THERAPY: CPT

## 2017-07-10 NOTE — PROGRESS NOTES
Betty Echevarria  : 1995 Therapy Center at [facility] Therapy  [address], [facility], [address]  [phone]   [phone]          OUTPATIENT PHYSICAL THERAPY: DAILY NOTE  7/10/2017    ICD-10: Treatment Diagnosis: M54.5  Low back pain  M25.552  L hip pain  Precautions/Allergies:   Review of patient's allergies indicates no known allergies. Fall Risk Score: 0 (? 5 = High Risk)  MD Orders: Eval and treat MEDICAL/REFERRING DIAGNOSIS:  Pain in left hip [M25.552]   DATE OF ONSET: about 1 month ago  REFERRING PHYSICIAN: Angela Farooq MD  RETURN PHYSICIAN APPOINTMENT: tomorrow     INITIAL ASSESSMENT:  Ms. Moshe Messina presents with pain in the L hip and postural asymmetry that is consistent the a lateral shift in the lumbar spine. Her pain is not due to any specific trauma, but she is a competitive golfer. Xray was negative for any significant finding; pt has MRI scheduled for tomorrow. She responded well to initial PT treatment to correct postural alignment. She is expected to continue to improve with postural correction to allow stabilization, and progression to exercise program to restore normal posture, strength and motion, without pain. She is well motivated. PROBLEM LIST (Impacting functional limitations):  1. Decreased ADL/Functional Activities  2. Increased Pain  3. Decreased Activity Tolerance  4. Decreased Flexibility/Joint Mobility  5. Decreased [place] with Home Exercise Program  6. INTERVENTIONS PLANNED:  1. Home Exercise Program (HEP)  2. Manual Therapy  3. Range of Motion (ROM)  4. Therapeutic Exercise/Strengthening  5. Modalities as appropriate, including traction   TREATMENT PLAN:  Effective Dates: 2017 TO 9-. Frequency/Duration: 3 times a week for 1 week, then 2 x per week, and upon reassessment will adjust frequency and duration as progress indicates.     GOALS: (Goals have been discussed and agreed upon with patient.)    SHORT-TERM FUNCTIONAL GOALS: Time Frame: 4 weeks  Pt able to maintain good postural correction   Decrease pain by 3/10 to improve sitting and standing tolerance  [place] in HEP with minimal cueing, and aware of precautions for exercise  Be able to demonstrate correct sitting posture and protective body mechanics to minimize spinal stress  DISCHARGE GOALS: Time Frame: 12 weeks  Decrease pain to <3/10 to allow return to golf  Improve trunk AROM to WNL for bending & lifting ADL's  Improve score on modified Oswestry Index by > 5 points to allow progression to independent program  Progression to advanced HEP with no cueing to allow discharge from therapy. Rehabilitation Potential For Stated Goals: Good  Regarding 16 [facility] [place]'s therapy, I certify that the treatment plan above will be carried out by a therapist or under their direction. Thank you for this referral,  Oscar Fenton PT     Referring Physician Signature: Lily Joe MD              Date                    The information in this section was collected on 6- (except where otherwise noted). 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 Pain initially started on the R, with a familiar tightening of the lumbar paraspinals. Then pain moved to the L and has settled in the hip. She noticed that her trunk was out of alignment when the pain shifted to the hip. Nothing she has tried has helped the alignment or the pain. She reports her pain is constant, but she is painfree in lying down. Pain is made worse with moderate to prolonged sitting, rising, bending, in the am and as the day progresses. It is painful but less so with walking, or standing. Pain does not radiate below the buttock, and does not disturb her sleep.      Past Medical History/Comorbidities:   Ms. Awais Forte  has no past medical history on file. Ms. Ernandez Shoulder  has a past surgical history that includes orthopaedic. She had L shoulder surgery about 7 yrs ago for a basketball injury. Social History/Living Environment:     lives with parents. Is sophomore at [employer],  Prior Level of Function/Work/Activity:  Full time student. On the golf team.  Active with exercise and sport  Dominant Side:         RIGHT  Personal Factors:          Age:  22 y.o. Current Medications:       Current Outpatient Prescriptions:     levalbuterol tartrate (XOPENEX HFA) 45 mcg/Actuation inhaler, Take  by inhalation. , Disp: , Rfl:    Date Last Reviewed:  7/10/2017     Number of Personal Factors/Comorbidities that affect the Plan of Care: 1-2: MODERATE COMPLEXITY   EXAMINATION:   Observation/Orthostatic Postural Assessment:          Pt is slightly round shouldered, with mildly forward head. In static standing shoulders are shifted to the L. Pt cannot correct posture in standing, nor can she maintain it if manually corrected. ROM:                Standing flexion= Moderately limited. Extn= Major limitation. Sideglide R= Major limitation. Sideglide L= nil limitation  Strength:          Weakness L DF and EHL -(4/5)  Compared to R  (5/5)  Neurological Screen:        Neural Tension Tests:  Positive on L        Sensation=  NT   Pt reports she had some tingling in L thigh about 3 weeks ago, but it has gone       Body Structures Involved:  1. Nerves  2. Bones  3. Joints  4. Muscles  5. Ligaments Body Functions Affected:  1. Sensory/Pain  2. Neuromusculoskeletal  3. Movement Related Activities and Participation Affected:  1. General Tasks and Demands  2. Mobility  3. Self Care  4. Domestic Life  5. Interpersonal Interactions and Relationships  6.  Community, Social and [place]   Number of elements that affect the Plan of Care: 3: MODERATE COMPLEXITY   CLINICAL PRESENTATION:   Presentation: Evolving clinical presentation with changing clinical characteristics: MODERATE COMPLEXITY   CLINICAL DECISION MAKING:   Outcome Measure: Tool Used: Modified Oswestry Low Back Pain Questionnaire  Score:  Initial: 26/50  Most Recent: X/50 (Date: -- )   Interpretation of Score: Each section is scored on a 0-5 scale, 5 representing the greatest disability. The scores of each section are added together for a total score of 50. Score 0 1-10 11-20 21-30 31-40 41-49 50   Modifier CH CI CJ CK CL CM CN     Medical Necessity:   · Skilled intervention continues to be required due to low back and hip pain that interferes with mobility, comfort, leisure. Reason for Services/Other Comments:  · Patient continues to require skilled intervention due to the need for specific exercise progression to restore normal alignment and promote return to previous functional level. .   Use of outcome tool(s) and clinical judgement create a POC that gives a: Questionable prediction of patient's progress: MODERATE COMPLEXITY            TREATMENT:   (In addition to Assessment/Re-Assessment sessions the following treatments were rendered)    Pre-treatment Symptoms/Complaints:  Pt reported that she is feeling better. Pt had DEBORAH on 7-3. She thinks the shot has helped. She can tell that her range for sideglide is better but she still can't maintain it for long. She has been compliant with HEP, and can now get hips to the L with ease. Pain: Initial:  Pain Intensity 1: 2  Post Session:  2         THERAPEUTIC EXERCISE: (40 minutes):  Exercises  to improve mobility, strength and coordination. Required verbal and physical  cues to promote proper body alignment. Progressed resistance, range and repetitions as indicated. Rechecked motion baselines--flexion = moderate limitation. Exten still moderately limited. Pt can now get to L sideglide without asst with ease. Sideglide to R is minimally limited. Pt worked on lots of manual correction of lateral shift.   This is much easier now, with no palpable splinting or guarding. Pt can easily get to overcorrection, wit therapist asst.  Added extn in overcorrected posn today. Was able to increase extn range with this today. Did A/P mobs for lumbar spine, to allow for better lumbar extn with pt- generated forces in prone. Did prone exnt ---pt able to get elbows fully extended, and add overpressure. Some pain in the L calf with this. With repeated reps, the pain was moved more proximally. By the time she left she could stand and walk with symmetry. Could maintain normal posture with faster walking. Left HEP the same--lateral shift in doorway or just in standing, followed by prone press ups    After treatment pt reported no pain in the L leg or buttock -- pain is anterior hip and laterally, above the buttock, and less intense. Modalities  ( 20 min): Intermittent lumbar traction--85#--x 20 min. Traction to decompress spine, provide axial stretch    Treatment/Session Assessment:  Pt tolerated treatment well today. She was pleased with the decrease in pain and the improvement in her posture. Her active standing motion is much better. She reports compliance with sideglide ex. She reports that it feels like the traction is helping. And she notes that it is much easier for her to do her sideglide exercises. She is very well motivated to be able to return to golf, but also reported patience with the process and does not want to return too soon and risk further injury. .  Is expected to continue to improve, to benefit from PT.      · Response to Treatment:  Decrease in pain and much improved static standing posture. Gait is with much better pace, and posture. · Compliance with Program/Exercises: Will assess as treatment progresses. · Recommendations/Intent for next treatment session:   Next visit will focus on decreasing pain, improving AROM,  Strength, and movement patterns, to restore optimal/normal function.     Total Treatment Duration:   60 min    PT Patient Time In/Time Out  Time In: 0915  Time Out: 1015    Total number of treatments:  2279 President  PT

## 2017-07-12 ENCOUNTER — HOSPITAL ENCOUNTER (OUTPATIENT)
Dept: PHYSICAL THERAPY | Age: 22
Discharge: HOME | End: 2017-07-12
Payer: COMMERCIAL

## 2017-07-12 PROCEDURE — 97012 MECHANICAL TRACTION THERAPY: CPT

## 2017-07-12 PROCEDURE — 97110 THERAPEUTIC EXERCISES: CPT

## 2017-07-13 NOTE — PROGRESS NOTES
Rossy Harmon  : 1995 Therapy [facility] at [facility] Therapy  [address], [facility], [address]  [phone]   [phone]          OUTPATIENT PHYSICAL THERAPY: DAILY NOTE  2017    ICD-10: Treatment Diagnosis: M54.5  Low back pain  M25.552  L hip pain  Precautions/Allergies:   Review of patient's allergies indicates no known allergies. Fall Risk Score: 0 (? 5 = High Risk)  MD Orders: Eval and treat MEDICAL/REFERRING DIAGNOSIS:  Pain in left hip [M25.552]   DATE OF ONSET: about 1 month ago  REFERRING PHYSICIAN: Alicja Hughes MD  RETURN PHYSICIAN APPOINTMENT: tomorrow     INITIAL ASSESSMENT:  Ms. Corona Atkins presents with pain in the L hip and postural asymmetry that is consistent the a lateral shift in the lumbar spine. Her pain is not due to any specific trauma, but she is a competitive golfer. Xray was negative for any significant finding; pt has MRI scheduled for tomorrow. She responded well to initial PT treatment to correct postural alignment. She is expected to continue to improve with postural correction to allow stabilization, and progression to exercise program to restore normal posture, strength and motion, without pain. She is well motivated. PROBLEM LIST (Impacting functional limitations):  1. Decreased ADL/Functional Activities  2. Increased Pain  3. Decreased Activity Tolerance  4. Decreased Flexibility/Joint Mobility  5. Decreased [place] with Home Exercise Program  6. INTERVENTIONS PLANNED:  1. Home Exercise Program (HEP)  2. Manual Therapy  3. Range of Motion (ROM)  4. Therapeutic Exercise/Strengthening  5. Modalities as appropriate, including traction   TREATMENT PLAN:  Effective Dates: 2017 TO 9-. Frequency/Duration: 3 times a week for 1 week, then 2 x per week, and upon reassessment will adjust frequency and duration as progress indicates.     GOALS: (Goals have been discussed and agreed upon with patient.)    SHORT-TERM FUNCTIONAL GOALS: Time Frame: 4 weeks  Pt able to maintain good postural correction   Decrease pain by 3/10 to improve sitting and standing tolerance  [place] in HEP with minimal cueing, and aware of precautions for exercise  Be able to demonstrate correct sitting posture and protective body mechanics to minimize spinal stress  DISCHARGE GOALS: Time Frame: 12 weeks  Decrease pain to <3/10 to allow return to golf  Improve trunk AROM to WNL for bending & lifting ADL's  Improve score on modified Oswestry Index by > 5 points to allow progression to independent program  Progression to advanced HEP with no cueing to allow discharge from therapy. Rehabilitation Potential For Stated Goals: Good  Regarding [facility] Yovani Troy's therapy, I certify that the treatment plan above will be carried out by a therapist or under their direction. Thank you for this referral,  Jason Velasquez, PT     Referring Physician Signature: Farhana oJy MD              Date                    The information in this section was collected on 6- (except where otherwise noted). 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 Pain initially started on the R, with a familiar tightening of the lumbar paraspinals. Then pain moved to the L and has settled in the hip. She noticed that her trunk was out of alignment when the pain shifted to the hip. Nothing she has tried has helped the alignment or the pain. She reports her pain is constant, but she is painfree in lying down. Pain is made worse with moderate to prolonged sitting, rising, bending, in the am and as the day progresses. It is painful but less so with walking, or standing. Pain does not radiate below the buttock, and does not disturb her sleep.      Past Medical History/Comorbidities:   Ms. Stefany Stewart  has no past medical history on file. Ms. Divya Pittman  has a past surgical history that includes orthopaedic. She had L shoulder surgery about 7 yrs ago for a basketball injury. Social History/Living Environment:     lives with parents. Is sophomore at [employer],  Prior Level of Function/Work/Activity:  Full time student. On the golf team.  Active with exercise and sport  Dominant Side:         RIGHT  Personal Factors:          Age:  22 y.o. Current Medications:       Current Outpatient Prescriptions:     levalbuterol tartrate (XOPENEX HFA) 45 mcg/Actuation inhaler, Take  by inhalation. , Disp: , Rfl:    Date Last Reviewed:  7/12/2017     Number of Personal Factors/Comorbidities that affect the Plan of Care: 1-2: MODERATE COMPLEXITY   EXAMINATION:   Observation/Orthostatic Postural Assessment:          Pt is slightly round shouldered, with mildly forward head. In static standing shoulders are shifted to the L. Pt cannot correct posture in standing, nor can she maintain it if manually corrected. ROM:                Standing flexion= Moderately limited. Extn= Major limitation. Sideglide R= Major limitation. Sideglide L= nil limitation  Strength:          Weakness L DF and EHL -(4/5)  Compared to R  (5/5)  Neurological Screen:        Neural Tension Tests:  Positive on L        Sensation=  NT   Pt reports she had some tingling in L thigh about 3 weeks ago, but it has gone       Body Structures Involved:  1. Nerves  2. Bones  3. Joints  4. Muscles  5. Ligaments Body Functions Affected:  1. Sensory/Pain  2. Neuromusculoskeletal  3. Movement Related Activities and Participation Affected:  1. General Tasks and Demands  2. Mobility  3. Self Care  4. Domestic Life  5. Interpersonal Interactions and Relationships  6.  Community, Social and [place]   Number of elements that affect the Plan of Care: 3: MODERATE COMPLEXITY   CLINICAL PRESENTATION:   Presentation: Evolving clinical presentation with changing clinical characteristics: MODERATE COMPLEXITY   CLINICAL DECISION MAKING:   Outcome Measure: Tool Used: Modified Oswestry Low Back Pain Questionnaire  Score:  Initial: 26/50  Most Recent: X/50 (Date: -- )   Interpretation of Score: Each section is scored on a 0-5 scale, 5 representing the greatest disability. The scores of each section are added together for a total score of 50. Score 0 1-10 11-20 21-30 31-40 41-49 50   Modifier CH CI CJ CK CL CM CN     Medical Necessity:   · Skilled intervention continues to be required due to low back and hip pain that interferes with mobility, comfort, leisure. Reason for Services/Other Comments:  · Patient continues to require skilled intervention due to the need for specific exercise progression to restore normal alignment and promote return to previous functional level. .   Use of outcome tool(s) and clinical judgement create a POC that gives a: Questionable prediction of patient's progress: MODERATE COMPLEXITY            TREATMENT:   (In addition to Assessment/Re-Assessment sessions the following treatments were rendered)    Pre-treatment Symptoms/Complaints:  Pt reported that she is feeling better. Still has a little pain in the anterior hip, but much less intense now. She has been compliant with HEP, and can now get hips to the L with ease. Pain: Initial:  Pain Intensity 1: 2  Post Session:  2         THERAPEUTIC EXERCISE: (40 minutes):  Exercises  to improve mobility, strength and coordination. Required verbal and physical  cues to promote proper body alignment. Progressed resistance, range and repetitions as indicated. Rechecked motion baselines--flexion = min limitation- can get fingers to toes, with slight effort. Exten-- moderately to minimally limited. Pt can now get to L sideglide without asst with ease. Sideglide to R is minimally to nil  limited. Pt worked on lots of manual correction of lateral shift.   This is much easier now, with no palpable splinting or guarding. Pt can easily get to overcorrection, wit therapist asst.  Added extn in overcorrected posn today. Was able to increase extn range with this today. Tried flexion rotation mobs --supine. Did rotation toward the L. Prolonged holds. Anterior pain seemed to decrease. By the time she left she could stand and walk with symmetry. Could maintain normal posture with faster walking. Left HEP the same--lateral shift in doorway or just in standing, followed by prone press ups. Added flexion rotation if she could find a place to do this. After treatment pt reported no pain in the L leg or buttock -- pain is anterior hip,  and less intense. Modalities  ( 20 min): Intermittent lumbar traction--85#--x 20 min. Traction to decompress spine, provide axial stretch    Treatment/Session Assessment:  Pt tolerated treatment well today. Her range for sideglide is better and she is starting to be able to maintain it for longer. She was pleased with the decrease in pain and the improvement in her posture. Her active standing motion is much better. She notes that it is much easier for her to do her sideglide exercises. She is very well motivated to be able to return to golf, but also reported patience with the process and does not want to return too soon and risk further injury. Seems to be progressing steadily. Return to the  on 7-17, then goes out of town for about 2 weeks        · Response to Treatment:  Decrease in pain and much improved static standing posture. Gait is with much better pace, and posture. · Compliance with Program/Exercises: Will assess as treatment progresses. · Recommendations/Intent for next treatment session:   Next visit will focus on decreasing pain, improving AROM,  Strength, and movement patterns, to restore optimal/normal function.     Total Treatment Duration:   60 min    PT Patient Time In/Time Out  Time In: 0200  Time Out: 0300    Total number of treatments:  [facility], PT

## 2017-10-09 NOTE — PROGRESS NOTES
Pam Campos  : 1995 Therapy [facility] at [facility] Therapy  [address], [facility], [address]  [phone]   [phone]          OUTPATIENT PHYSICAL THERAPY: DISCONTINUATION SUMMARY  10/9/2017    ICD-10: Treatment Diagnosis: M54.5  Low back pain  M25.552  L hip pain  Precautions/Allergies:   Review of patient's allergies indicates no known allergies. Fall Risk Score: 0 (? 5 = High Risk)  MD Orders: Eval and treat MEDICAL/REFERRING DIAGNOSIS:  Pain in left hip [M25.552]   DATE OF ONSET: about 1 month ago  REFERRING PHYSICIAN: Moise Lindo MD  RETURN PHYSICIAN APPOINTMENT: tomorrow     DISCONTINUATION ASSESSMENT:  Ms. Laureano Muñoz presented with pain in the L hip and postural asymmetry that was consistent the a lateral shift in the lumbar spine. Her pain was not due to any specific trauma, but she is a competitive golfer. Xray was negative for any significant finding. Pt was well motivated and eager to return to gold ( on scholarship at [employer]), but also patient enough to work for full recovery, rather than risk injury with return to sport too soon. She responded well to PT, emphasizing postural correction of her lateral shift, but was still having difficulty maintaining it for long periods. Pain was well managed. Last visit was on --she had return appt with  the next week then out of town until school started  Last known status is as reported on . Physical therapy has been discontinued.       1.        GOALS: (Goals have been discussed and agreed upon with patient.)    SHORT-TERM FUNCTIONAL GOALS: Time Frame: 4 weeks-- goals not reassessed  Pt able to maintain good postural correction   Decrease pain by 3/10 to improve sitting and standing tolerance  [place] in HEP with minimal cueing, and aware of precautions for exercise  Be able to demonstrate correct sitting posture and protective body mechanics to minimize spinal stress  DISCHARGE GOALS: Time Frame: 12 weeks  Decrease pain to <3/10 to allow return to golf  Improve trunk AROM to WNL for bending & lifting ADL's  Improve score on modified Oswestry Index by > 5 points to allow progression to independent program  Progression to advanced HEP with no cueing to allow discharge from therapy. TREATMENT PLAN: Physical therapy has been discontinued.    Jeraline Rinne, PT

## 2017-12-05 PROBLEM — M51.26 HNP (HERNIATED NUCLEUS PULPOSUS), LUMBAR: Status: ACTIVE | Noted: 2017-12-05

## 2017-12-05 PROBLEM — M54.16 LUMBAR RADICULOPATHY: Status: ACTIVE | Noted: 2017-12-05

## 2017-12-05 PROBLEM — M48.062 LUMBAR STENOSIS WITH NEUROGENIC CLAUDICATION: Status: ACTIVE | Noted: 2017-12-05

## 2018-01-03 ENCOUNTER — ANESTHESIA EVENT (OUTPATIENT)
Dept: SURGERY | Age: 23
End: 2018-01-03
Payer: COMMERCIAL

## 2018-01-04 ENCOUNTER — HOSPITAL ENCOUNTER (OUTPATIENT)
Dept: LAB | Age: 23
Discharge: HOME | End: 2018-01-04
Payer: COMMERCIAL

## 2018-01-04 LAB
BACTERIA SPEC CULT: NORMAL
HGB BLD-MCNC: 13.8 G/DL (ref 11.7–15.4)
SERVICE CMNT-IMP: NORMAL

## 2018-01-04 PROCEDURE — 87641 MR-STAPH DNA AMP PROBE: CPT | Performed by: NEUROLOGICAL SURGERY

## 2018-01-04 PROCEDURE — 85018 HEMOGLOBIN: CPT | Performed by: NEUROLOGICAL SURGERY

## 2018-01-04 PROCEDURE — 36415 COLL VENOUS BLD VENIPUNCTURE: CPT | Performed by: NEUROLOGICAL SURGERY

## 2018-01-05 ENCOUNTER — APPOINTMENT (OUTPATIENT)
Dept: GENERAL RADIOLOGY | Age: 23
End: 2018-01-05
Attending: NEUROLOGICAL SURGERY
Payer: COMMERCIAL

## 2018-01-05 ENCOUNTER — HOSPITAL ENCOUNTER (OUTPATIENT)
Age: 23
Setting detail: OUTPATIENT SURGERY
Discharge: HOME | End: 2018-01-05
Attending: NEUROLOGICAL SURGERY | Admitting: NEUROLOGICAL SURGERY
Payer: COMMERCIAL

## 2018-01-05 ENCOUNTER — ANESTHESIA (OUTPATIENT)
Dept: SURGERY | Age: 23
End: 2018-01-05
Payer: COMMERCIAL

## 2018-01-05 VITALS
WEIGHT: 194.25 LBS | SYSTOLIC BLOOD PRESSURE: 126 MMHG | HEART RATE: 71 BPM | OXYGEN SATURATION: 97 % | TEMPERATURE: 98.4 F | BODY MASS INDEX: 30.42 KG/M2 | RESPIRATION RATE: 17 BRPM | DIASTOLIC BLOOD PRESSURE: 68 MMHG

## 2018-01-05 DIAGNOSIS — M48.062 LUMBAR STENOSIS WITH NEUROGENIC CLAUDICATION: Primary | ICD-10-CM

## 2018-01-05 LAB — HCG UR QL: NEGATIVE

## 2018-01-05 PROCEDURE — 74011250636 HC RX REV CODE- 250/636

## 2018-01-05 PROCEDURE — 74011000250 HC RX REV CODE- 250

## 2018-01-05 PROCEDURE — 72020 X-RAY EXAM OF SPINE 1 VIEW: CPT

## 2018-01-05 PROCEDURE — 77030018390 HC SPNG HEMSTAT2 J&J -B: Performed by: NEUROLOGICAL SURGERY

## 2018-01-05 PROCEDURE — 74011250637 HC RX REV CODE- 250/637: Performed by: ANESTHESIOLOGY

## 2018-01-05 PROCEDURE — 77030019908 HC STETH ESOPH SIMS -A: Performed by: ANESTHESIOLOGY

## 2018-01-05 PROCEDURE — 77030032490 HC SLV COMPR SCD KNE COVD -B: Performed by: NEUROLOGICAL SURGERY

## 2018-01-05 PROCEDURE — 74011250636 HC RX REV CODE- 250/636: Performed by: NEUROLOGICAL SURGERY

## 2018-01-05 PROCEDURE — 76060000034 HC ANESTHESIA 1.5 TO 2 HR: Performed by: NEUROLOGICAL SURGERY

## 2018-01-05 PROCEDURE — 77030019940 HC BLNKT HYPOTHRM STRY -B: Performed by: ANESTHESIOLOGY

## 2018-01-05 PROCEDURE — 74011250636 HC RX REV CODE- 250/636: Performed by: ANESTHESIOLOGY

## 2018-01-05 PROCEDURE — 77030011640 HC PAD GRND REM COVD -A: Performed by: NEUROLOGICAL SURGERY

## 2018-01-05 PROCEDURE — 76010000162 HC OR TIME 1.5 TO 2 HR INTENSV-TIER 1: Performed by: NEUROLOGICAL SURGERY

## 2018-01-05 PROCEDURE — 77030019557 HC ELECTRD VES SEAL MEDT -F: Performed by: NEUROLOGICAL SURGERY

## 2018-01-05 PROCEDURE — 77030008477 HC STYL SATN SLP COVD -A: Performed by: ANESTHESIOLOGY

## 2018-01-05 PROCEDURE — 77030008703 HC TU ET UNCUF COVD -A: Performed by: ANESTHESIOLOGY

## 2018-01-05 PROCEDURE — 76210000016 HC OR PH I REC 1 TO 1.5 HR: Performed by: NEUROLOGICAL SURGERY

## 2018-01-05 PROCEDURE — 88304 TISSUE EXAM BY PATHOLOGIST: CPT | Performed by: NEUROLOGICAL SURGERY

## 2018-01-05 PROCEDURE — 77030020782 HC GWN BAIR PAWS FLX 3M -B: Performed by: ANESTHESIOLOGY

## 2018-01-05 PROCEDURE — 76210000021 HC REC RM PH II 0.5 TO 1 HR: Performed by: NEUROLOGICAL SURGERY

## 2018-01-05 PROCEDURE — 77030030163 HC BN WAX J&J -A: Performed by: NEUROLOGICAL SURGERY

## 2018-01-05 PROCEDURE — 77030026016 HC SEAL HEMSTAT SRGFL J&J -B: Performed by: NEUROLOGICAL SURGERY

## 2018-01-05 PROCEDURE — 81025 URINE PREGNANCY TEST: CPT

## 2018-01-05 PROCEDURE — 77030018836 HC SOL IRR NACL ICUM -A: Performed by: NEUROLOGICAL SURGERY

## 2018-01-05 PROCEDURE — 77030014007 HC SPNG HEMSTAT J&J -B: Performed by: NEUROLOGICAL SURGERY

## 2018-01-05 PROCEDURE — 74011000250 HC RX REV CODE- 250: Performed by: NEUROLOGICAL SURGERY

## 2018-01-05 PROCEDURE — 77030003029 HC SUT VCRL J&J -B: Performed by: NEUROLOGICAL SURGERY

## 2018-01-05 RX ORDER — PROPOFOL 10 MG/ML
INJECTION, EMULSION INTRAVENOUS AS NEEDED
Status: DISCONTINUED | OUTPATIENT
Start: 2018-01-05 | End: 2018-01-05 | Stop reason: HOSPADM

## 2018-01-05 RX ORDER — GLYCOPYRROLATE 0.2 MG/ML
INJECTION INTRAMUSCULAR; INTRAVENOUS AS NEEDED
Status: DISCONTINUED | OUTPATIENT
Start: 2018-01-05 | End: 2018-01-05 | Stop reason: HOSPADM

## 2018-01-05 RX ORDER — CEFAZOLIN SODIUM/WATER 2 G/20 ML
2 SYRINGE (ML) INTRAVENOUS
Status: COMPLETED | OUTPATIENT
Start: 2018-01-05 | End: 2018-01-05

## 2018-01-05 RX ORDER — NEOSTIGMINE METHYLSULFATE 1 MG/ML
INJECTION INTRAVENOUS AS NEEDED
Status: DISCONTINUED | OUTPATIENT
Start: 2018-01-05 | End: 2018-01-05 | Stop reason: HOSPADM

## 2018-01-05 RX ORDER — HALOPERIDOL 5 MG/ML
1 INJECTION INTRAMUSCULAR ONCE
Status: COMPLETED | OUTPATIENT
Start: 2018-01-05 | End: 2018-01-05

## 2018-01-05 RX ORDER — LIDOCAINE HYDROCHLORIDE 10 MG/ML
0.1 INJECTION INFILTRATION; PERINEURAL AS NEEDED
Status: DISCONTINUED | OUTPATIENT
Start: 2018-01-05 | End: 2018-01-05 | Stop reason: HOSPADM

## 2018-01-05 RX ORDER — DEXAMETHASONE SODIUM PHOSPHATE 4 MG/ML
INJECTION, SOLUTION INTRA-ARTICULAR; INTRALESIONAL; INTRAMUSCULAR; INTRAVENOUS; SOFT TISSUE AS NEEDED
Status: DISCONTINUED | OUTPATIENT
Start: 2018-01-05 | End: 2018-01-05 | Stop reason: HOSPADM

## 2018-01-05 RX ORDER — BUPIVACAINE HYDROCHLORIDE 5 MG/ML
INJECTION, SOLUTION EPIDURAL; INTRACAUDAL AS NEEDED
Status: DISCONTINUED | OUTPATIENT
Start: 2018-01-05 | End: 2018-01-05 | Stop reason: HOSPADM

## 2018-01-05 RX ORDER — SODIUM CHLORIDE 0.9 % (FLUSH) 0.9 %
5-10 SYRINGE (ML) INJECTION AS NEEDED
Status: DISCONTINUED | OUTPATIENT
Start: 2018-01-05 | End: 2018-01-05 | Stop reason: HOSPADM

## 2018-01-05 RX ORDER — SODIUM CHLORIDE, SODIUM LACTATE, POTASSIUM CHLORIDE, CALCIUM CHLORIDE 600; 310; 30; 20 MG/100ML; MG/100ML; MG/100ML; MG/100ML
1000 INJECTION, SOLUTION INTRAVENOUS CONTINUOUS
Status: DISCONTINUED | OUTPATIENT
Start: 2018-01-05 | End: 2018-01-05 | Stop reason: HOSPADM

## 2018-01-05 RX ORDER — CEFAZOLIN SODIUM 1 G/3ML
INJECTION, POWDER, FOR SOLUTION INTRAMUSCULAR; INTRAVENOUS AS NEEDED
Status: DISCONTINUED | OUTPATIENT
Start: 2018-01-05 | End: 2018-01-05 | Stop reason: HOSPADM

## 2018-01-05 RX ORDER — ROCURONIUM BROMIDE 10 MG/ML
INJECTION, SOLUTION INTRAVENOUS AS NEEDED
Status: DISCONTINUED | OUTPATIENT
Start: 2018-01-05 | End: 2018-01-05 | Stop reason: HOSPADM

## 2018-01-05 RX ORDER — HALOPERIDOL 5 MG/ML
INJECTION INTRAMUSCULAR
Status: DISCONTINUED
Start: 2018-01-05 | End: 2018-01-05 | Stop reason: HOSPADM

## 2018-01-05 RX ORDER — OXYCODONE AND ACETAMINOPHEN 7.5; 325 MG/1; MG/1
1 TABLET ORAL
Qty: 21 TAB | Refills: 0 | Status: SHIPPED | OUTPATIENT
Start: 2018-01-05

## 2018-01-05 RX ORDER — HYDROMORPHONE HYDROCHLORIDE 2 MG/ML
0.5 INJECTION, SOLUTION INTRAMUSCULAR; INTRAVENOUS; SUBCUTANEOUS
Status: DISCONTINUED | OUTPATIENT
Start: 2018-01-05 | End: 2018-01-05 | Stop reason: HOSPADM

## 2018-01-05 RX ORDER — ALBUTEROL SULFATE 0.83 MG/ML
2.5 SOLUTION RESPIRATORY (INHALATION) AS NEEDED
Status: DISCONTINUED | OUTPATIENT
Start: 2018-01-05 | End: 2018-01-05 | Stop reason: HOSPADM

## 2018-01-05 RX ORDER — FENTANYL CITRATE 50 UG/ML
INJECTION, SOLUTION INTRAMUSCULAR; INTRAVENOUS AS NEEDED
Status: DISCONTINUED | OUTPATIENT
Start: 2018-01-05 | End: 2018-01-05 | Stop reason: HOSPADM

## 2018-01-05 RX ORDER — ONDANSETRON 2 MG/ML
4 INJECTION INTRAMUSCULAR; INTRAVENOUS
Status: COMPLETED | OUTPATIENT
Start: 2018-01-05 | End: 2018-01-05

## 2018-01-05 RX ORDER — ONDANSETRON 2 MG/ML
INJECTION INTRAMUSCULAR; INTRAVENOUS AS NEEDED
Status: DISCONTINUED | OUTPATIENT
Start: 2018-01-05 | End: 2018-01-05 | Stop reason: HOSPADM

## 2018-01-05 RX ORDER — SODIUM CHLORIDE 0.9 % (FLUSH) 0.9 %
5-10 SYRINGE (ML) INJECTION EVERY 8 HOURS
Status: DISCONTINUED | OUTPATIENT
Start: 2018-01-05 | End: 2018-01-05 | Stop reason: HOSPADM

## 2018-01-05 RX ORDER — ACETAMINOPHEN 500 MG
1000 TABLET ORAL ONCE
Status: COMPLETED | OUTPATIENT
Start: 2018-01-05 | End: 2018-01-05

## 2018-01-05 RX ORDER — MIDAZOLAM HYDROCHLORIDE 1 MG/ML
2 INJECTION, SOLUTION INTRAMUSCULAR; INTRAVENOUS
Status: COMPLETED | OUTPATIENT
Start: 2018-01-05 | End: 2018-01-05

## 2018-01-05 RX ORDER — LIDOCAINE HYDROCHLORIDE 20 MG/ML
INJECTION, SOLUTION EPIDURAL; INFILTRATION; INTRACAUDAL; PERINEURAL AS NEEDED
Status: DISCONTINUED | OUTPATIENT
Start: 2018-01-05 | End: 2018-01-05 | Stop reason: HOSPADM

## 2018-01-05 RX ADMIN — PROPOFOL 200 MG: 10 INJECTION, EMULSION INTRAVENOUS at 14:03

## 2018-01-05 RX ADMIN — GLYCOPYRROLATE 0.4 MG: 0.2 INJECTION INTRAMUSCULAR; INTRAVENOUS at 15:21

## 2018-01-05 RX ADMIN — NEOSTIGMINE METHYLSULFATE 3 MG: 1 INJECTION INTRAVENOUS at 15:21

## 2018-01-05 RX ADMIN — MIDAZOLAM HYDROCHLORIDE 2 MG: 1 INJECTION, SOLUTION INTRAMUSCULAR; INTRAVENOUS at 13:43

## 2018-01-05 RX ADMIN — SODIUM CHLORIDE, SODIUM LACTATE, POTASSIUM CHLORIDE, AND CALCIUM CHLORIDE: 600; 310; 30; 20 INJECTION, SOLUTION INTRAVENOUS at 15:11

## 2018-01-05 RX ADMIN — SODIUM CHLORIDE, SODIUM LACTATE, POTASSIUM CHLORIDE, AND CALCIUM CHLORIDE 1000 ML: 600; 310; 30; 20 INJECTION, SOLUTION INTRAVENOUS at 12:52

## 2018-01-05 RX ADMIN — ONDANSETRON 4 MG: 2 INJECTION INTRAMUSCULAR; INTRAVENOUS at 15:02

## 2018-01-05 RX ADMIN — HYDROMORPHONE HYDROCHLORIDE 0.5 MG: 2 INJECTION, SOLUTION INTRAMUSCULAR; INTRAVENOUS; SUBCUTANEOUS at 16:22

## 2018-01-05 RX ADMIN — ROCURONIUM BROMIDE 40 MG: 10 INJECTION, SOLUTION INTRAVENOUS at 14:03

## 2018-01-05 RX ADMIN — Medication 2 G: at 14:05

## 2018-01-05 RX ADMIN — FENTANYL CITRATE 50 MCG: 50 INJECTION, SOLUTION INTRAMUSCULAR; INTRAVENOUS at 15:29

## 2018-01-05 RX ADMIN — FENTANYL CITRATE 50 MCG: 50 INJECTION, SOLUTION INTRAMUSCULAR; INTRAVENOUS at 14:17

## 2018-01-05 RX ADMIN — LIDOCAINE HYDROCHLORIDE 100 MG: 20 INJECTION, SOLUTION EPIDURAL; INFILTRATION; INTRACAUDAL; PERINEURAL at 14:03

## 2018-01-05 RX ADMIN — HALOPERIDOL LACTATE 1 MG: 5 INJECTION, SOLUTION INTRAMUSCULAR at 15:45

## 2018-01-05 RX ADMIN — DEXAMETHASONE SODIUM PHOSPHATE 4 MG: 4 INJECTION, SOLUTION INTRA-ARTICULAR; INTRALESIONAL; INTRAMUSCULAR; INTRAVENOUS; SOFT TISSUE at 15:13

## 2018-01-05 RX ADMIN — FENTANYL CITRATE 100 MCG: 50 INJECTION, SOLUTION INTRAMUSCULAR; INTRAVENOUS at 14:03

## 2018-01-05 RX ADMIN — ACETAMINOPHEN 1000 MG: 500 TABLET, FILM COATED ORAL at 12:52

## 2018-01-05 RX ADMIN — ONDANSETRON 4 MG: 2 INJECTION INTRAMUSCULAR; INTRAVENOUS at 15:42

## 2018-01-05 RX ADMIN — HYDROMORPHONE HYDROCHLORIDE 0.5 MG: 2 INJECTION, SOLUTION INTRAMUSCULAR; INTRAVENOUS; SUBCUTANEOUS at 16:06

## 2018-01-05 NOTE — BRIEF OP NOTE
BRIEF OPERATIVE NOTE    Date of Procedure: 1/5/2018   Preoperative Diagnosis: Lumbar stenosis with neurogenic claudication [M48.062]  Postoperative Diagnosis:Lumbar stenosis of spine [M48.02]    Procedure(s):  LEFT L4 5 LAMINECTOMY AND DISCECTOMY  Surgeon(s) and Role:     * Libia Figueroa MD - Primary         Assistant Staff:  Nurse Practitioner: Valentina Moraes NP    Surgical Staff:  Circ-1: Ashli Moreno RN  Circ-2: Vania Cowden, RN  Circ-Relief: Delia Shrestha RN  Radiology Technician: David García, RT, R, CT  Scrub Tech-1: Olinda Shelton  Scrub Tech-2: Gus Aceves  Scrub Private/Assistant: Jovan Alexis  Nurse Practitioner:  Valentina Moraes NP  Event Time In   Incision Start 1419   Incision Close      Anesthesia: General   Estimated Blood Loss: minimal  Specimens: * No specimens in log *   Findings: hnp+stenosis   Complications: none  Implants: * No implants in log *

## 2018-01-05 NOTE — IP AVS SNAPSHOT
[address] [address] 
[phone] Patient: Ana Bunch MRN: [id number] :1995 About your hospitalization You were admitted on:  2018 You last received care in the:  [facility] PACU You were discharged on:  2018 Why you were hospitalized Your primary diagnosis was:  Not on File Follow-up Information Follow up With Details Comments Contact Info Day Nicholson MD  Follow up in 2 weeks. [facility] [address] [facility] [address] 
[phone] Discharge Orders None A check shital indicates which time of day the medication should be taken. My Medications START taking these medications Instructions Each Dose to Equal  
 Morning Noon Evening Bedtime  
 oxyCODONE-acetaminophen 7.5-325 mg per tablet Commonly known as:  PERCOCET 7.5 Your last dose was: Your next dose is: Take 1 Tab by mouth every eight (8) hours as needed for Pain. Max Daily Amount: 3 Tabs. 1 Tab CONTINUE taking these medications Instructions Each Dose to Equal  
 Morning Noon Evening Bedtime  
 cyclobenzaprine 5 mg tablet Commonly known as:  FLEXERIL Your last dose was: Your next dose is:    
   
   
 Can take 1-2 tablets at bedtime. Can take 1 tablet twice daily if it doesn't cause sedation. DUEXIS 800-26.6 mg Tab Generic drug:  ibuprofen-famotidine Your last dose was: Your next dose is: Take  by mouth. oxyCODONE IR 5 mg immediate release tablet Commonly known as:  Audrea Harlingen Your last dose was: Your next dose is: Take 5 mg by mouth every four (4) hours as needed for Pain. 5 mg Where to Get Your Medications Information on where to get these meds will be given to you by the nurse or doctor. ! Ask your nurse or doctor about these medications  
  oxyCODONE-acetaminophen 7.5-325 mg per tablet [facility]charge Instructions [facility], [address] 
[phone] Postoperative Home Instructions Lumbar (Back) Surgery · Showering: You may shower the first day you are home with the dressing on. If the dressing becomes saturated, change it completely to a similar dressing. Leave the steri-strips or glue in place. After 3 days, you may take the dressing off and leave it off. If you have the brown aquacel dressing, leave it alone for 5 days and then you may remove the dressing. Do not soak in a tub, and use only soap and water on the wound. · Wound Care: A small to moderate amount of reddish drainage on the dressing is normal the first 1-2 days after surgery. If the dressing becomes saturated, change it. Once the steri-strips begin to peel up on their own, you may gently take them off. Large amounts of clear, watery drainage is not normal and you should call our office immediately. · Signs of Infection: Extreme tenderness at the wound, excessive redness and/or swelling, or ugly yellowish-greenish drainage from the wound. Fever greater than 100.5 may be present. If you think you have a wound infection, call our office immediately. · Driving: You may not begin driving until after your visit to our office for a wound and suture check which is normally 7-10 days after you come home from the hospital. 
 
· Medications: You should take anti-inflammatory medications such as Motrin, Celebrex for 30 days after surgery, every day, on a regular schedule only if prescribed by your physician. The pain medicine prescribed may be taken as needed.   You should take a stool softener (Colace) twice a day, drink lots of water and eat high fiber foods to avoid constipation (this is a common problem with pain medicine.) · Deep Breathing Exercises: Continue to do your incentive spirometry and/or deep breathing exercises to prevent risk of pneumonia. · Smoking: YOU MAY NOT SMOKE! Smoking will interfere with your healing. If you smoke, you may end up with having another surgery or more problems! · Activity: No heavy lifting for 4 weeks after surgery. This means anything heavier than a coffee cup or newspaper. After 4 weeks, you may gradually begin lifting heavier things. Avoid bending, stooping, or twisting at the waist.  Do not lie on your stomach to sleep. · You may remove your Moises hose when consistently walking. You may do steps and inclines in moderation. · Sexual Relations: You may resume sexual relations 2 weeks after your surgery. · Walking Program: You should begin walking every day on the first day after surgery. Start for short distances, then go a little farther each day. You should eventually walk 1-2 miles every day for the long term. This is very important in your recovery period because walking strengthens the spinal muscles and will help protect your disc and vertebrae. · Symptoms after Surgery: Dont be alarmed if you still have some symptoms after surgery. The nerves often require time to heal after the pressure has been taken off. Be patient, you should see improvement with time. · Follow-up: You will need to call our office for an appointment to see a nurse one week after surgery for a wound check. An appointment will be made then for you to see your surgeon about 4 weeks after surgery. Please call our office if you have any other questions or problems. Listen to your body; it will tell you if you are overdoing it. Use common sense and take care of yourself!  
  
After general anesthesia or intravenous sedation, for 24 hours or while taking prescription Narcotics: · Limit your activities · Do not drive and operate hazardous machinery · Do not make important personal or business decisions · Do  not drink alcoholic beverages · If you have not urinated within 8 hours after discharge, please contact your surgeon on call. *  Please give a list of your current medications to your Primary Care Provider. *  Please update this list whenever your medications are discontinued, doses are 
    changed, or new medications (including over-the-counter products) are added. *  Please carry medication information at all times in case of emergency situations. These are general instructions for a healthy lifestyle: No smoking/ No tobacco products/ Avoid exposure to second hand smoke Surgeon General's Warning:  Quitting smoking now greatly reduces serious risk to your health. Obesity, smoking, and sedentary lifestyle greatly increases your risk for illness A healthy diet, regular physical exercise & weight monitoring are important for maintaining a healthy lifestyle You may be retaining fluid if you have a history of heart failure or if you experience any of the following symptoms:  Weight gain of 3 pounds or more overnight or 5 pounds in a week, increased swelling in our hands or feet or shortness of breath while lying flat in bed. Please call your doctor as soon as you notice any of these symptoms; do not wait until your next office visit. Recognize signs and symptoms of STROKE: 
F-face looks uneven A-arms unable to move or move unevenly S-speech slurred or non-existent T-time-call 911 as soon as signs and symptoms begin-DO NOT go Back to bed or wait to see if you get better-TIME IS BRAIN. Introducing [facility] & HEALTH SERVICES! Gabriela Ibarra introduces [employer] patient portal. Now you can access parts of your medical record, email your doctor's office, and request medication refills online.    
 
1. In your internet browser, go to [email or web address] 2. Click on the First Time User? Click Here link in the Sign In box. You will see the New Member Sign Up page. 3. Enter your Sparks Access Code exactly as it appears below. You will not need to use this code after youve completed the sign-up process. If you do not sign up before the expiration date, you must request a new code. · Sparks Access Code: [email or web address] Expires: 1/8/2018 11:53 AM 
 
4. Enter the last four digits of your Social Security Number (xxxx) and Date of Birth (mm/dd/yyyy) as indicated and click Submit. You will be taken to the next sign-up page. 5. Create a [employer] Glob[employer] ID. This will be your Sparks login ID and cannot be changed, so think of one that is secure and easy to remember. 6. Create a Sparks password. You can change your password at any time. 7. Enter your Password Reset Question and Answer. This can be used at a later time if you forget your password. 8. Enter your e-mail address. You will receive e-mail notification when new information is available in [address] 9. Click Sign Up. You can now view and download portions of your medical record. 10. Click the Download Summary menu link to download a portable copy of your medical information. If you have questions, please visit the Frequently Asked Questions section of the Sparks website. Remember, Sparks is NOT to be used for urgent needs. For medical emergencies, dial 911. Now available from your iPhone and Android! Providers Seen During Your Hospitalization Provider Specialty Primary office phone Haleigh Golden MD Neurosurgery [phone] Your Primary Care Physician (PCP) Primary Care Physician Office Phone Office Fax Sylvia Bruno You are allergic to the following No active allergies Recent Documentation Weight BMI OB Status Smoking Status 88.1 kg 30.42 kg/m2 Having regular periods Never Smoker Emergency Contacts Name Discharge Info Relation Home Work Mobile Elan Jean  Father [15] [phone] Tarah Young  Mother [14] [phone] Patient Belongings The following personal items are in your possession at time of discharge: 
  Dental Appliances: None  Visual Aid: None      Home Medications: None   Jewelry: None  Clothing: Shirt, Pants, Footwear, Undergarments    Other Valuables: None Please provide this summary of care documentation to your next provider. Signatures-by signing, you are acknowledging that this After Visit Summary has been reviewed with you and you have received a copy. Patient Signature:  ____________________________________________________________ Date:  ____________________________________________________________  
  
[facility] Provider Signature:  ____________________________________________________________ Date:  ____________________________________________________________

## 2018-01-05 NOTE — ANESTHESIA POSTPROCEDURE EVALUATION
Post-Anesthesia Evaluation and Assessment    Patient: Dina Cagle MRN: [id number]  SSN: [id number]    YOB: 1995  Age: 25 y.o. Sex: female       Cardiovascular Function/Vital Signs  Visit Vitals    /65    Pulse 80    Temp 36.9 °C (98.4 °F)    Resp 16    Wt 88.1 kg (194 lb 4 oz)    SpO2 98%    BMI 30.42 kg/m2       Patient is status post general anesthesia for Procedure(s):  LEFT L4 5 LAMINECTOMY AND DISCECTOMY. Nausea/Vomiting: None    Postoperative hydration reviewed and adequate. Pain:  Pain Scale 1: Numeric (0 - 10) (01/05/18 1606)  Pain Intensity 1: 7 (01/05/18 1606)   Managed    Neurological Status:   Neuro (WDL): Exceptions to WDL (01/05/18 1539)  Neuro  Neurologic State: Drowsy (01/05/18 1539)  Cognition: Follows commands (01/05/18 1539)  Speech: Clear (01/05/18 1539)  Assessment L Pupil: Brisk;Round (01/05/18 1539)  Assessment R Pupil: Brisk;Round (01/05/18 1539)  LUE Motor Response: Purposeful (01/05/18 1539)  LLE Motor Response: Purposeful (01/05/18 1539)  RUE Motor Response: Purposeful (01/05/18 1539)  RLE Motor Response: Purposeful (01/05/18 1539)   At baseline    Mental Status and Level of Consciousness: Arousable    Pulmonary Status:   O2 Device: Room air (01/05/18 1539)   Adequate oxygenation and airway patent    Complications related to anesthesia: None    Post-anesthesia assessment completed.  No concerns    Signed By: Phil Jackson MD     January 5, 2018

## 2018-01-05 NOTE — ANESTHESIA PREPROCEDURE EVALUATION
Anesthetic History   No history of anesthetic complications            Review of Systems / Medical History  Patient summary reviewed and pertinent labs reviewed    Pulmonary            Asthma        Neuro/Psych   Within defined limits           Cardiovascular                  Exercise tolerance: >4 METS     GI/Hepatic/Renal  Within defined limits              Endo/Other        Obesity     Other Findings              Physical Exam    Airway  Mallampati: II  TM Distance: 4 - 6 cm  Neck ROM: normal range of motion   Mouth opening: Normal     Cardiovascular    Rhythm: regular  Rate: normal      Pertinent negatives: No murmur, JVD and peripheral edema   Dental  No notable dental hx       Pulmonary  Breath sounds clear to auscultation               Abdominal         Other Findings            Anesthetic Plan    ASA: 2  Anesthesia type: general          Induction: Intravenous  Anesthetic plan and risks discussed with: Patient      Prone geta

## 2018-01-05 NOTE — OP NOTES
[facility]  OPERATIVE REPORT    Dario Guallpa  MR#: [id number]  : 1995  ACCOUNT #: [de-identified]   DATE OF SERVICE: 2018    PREOPERATIVE DIAGNOSIS:  Left L5 radiculopathy secondary to disk herniation and stenosis. POSTOPERATIVE DIAGNOSIS:  Left L5 radiculopathy secondary to disk herniation and stenosis. OPERATION PROCEDURE:  Left L4-5 laminectomy, facetectomy, foraminotomy, and diskectomy. SURGEON:  Pankaj Quiñonez MD.    ANESTHESIA:  General endotracheal.    ESTIMATED BLOOD LOSS:  Minimal.    PREPARATION:  ChloraPrep. COMPLICATIONS:  None. SPECIMENS REMOVED:  L4-5 disk space. 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 MRI scan was positive for disk herniation and stenosis on the left at L4-5 with compression. The patient was admitted for surgery as conservative measures have failed. OPERATIVE NOTE:  The patient was brought to the operating room and carefully placed under general endotracheal anesthesia without complication and was carefully turned prone on the Cloward frame. The posterior aspect of the back was shaved and prepped in usual sterile fashion. Incision was made overlying L4 and L5. Muscles were stripped in the left lateral subperiosteal plane with cautery and elevators and deep retractors were placed. Lateral lumbar spine x-ray confirmed retractors at the L4-5 space, however, an instrument pointing towards the L3-4 disk space. Therefore, the retractors were moved inferiorly one level and the correct L4-5 level was identified. Laminectomy and facetectomy were carried out with 3 and 4 mm Kerrison rongeurs. A plane was created between the ligamentum flavum and dura. Ligament was removed to expose the dural sac.   Distal foraminotomy and medial facetectomy were widened to decompress a very hyperemic and swollen appearing L5 nerve root. It was carefully mobilized medially after dissecting ventrally to expose extruded disk material at the disk space. This was incised with a 15 blade and removed in several large pieces with pituitary rongeurs. The disk space was coagulated and palpated for additional disk fragments. None were seen. A ball tip probe was passed easily under the nerve root without further compression. The wound was irrigated until clear. Thrombin was placed. No further bleeding was encountered and the wound was closed. The fascia was closed tightly with interrupted 0 Vicryl. 2% Marcaine with epinephrine was used to infiltrate the muscle for postoperative analgesia. Subcutaneous tissues were closed with intubation 3-0 Vicryl. Skin was closed with Prineo tape and Dermabond. Sterile dressings were placed. The patient tolerated the procedure well and was turned supine, awakened, extubated, and taken to PACU in stable condition. There were no obvious complications. DISCHARGE INSTRUCTIONS:  DIET:  Regular. ACTIVITY:  As tolerated. No heavy lifting, bending or automobile driving, showers are permissible, but no baths. The patient will contact physician if she develops any fever, drainage, swelling, cellulitis or neurological change. Return visit in 10-14 days for wound check. DISCHARGE MEDICATIONS:  Include admission medicines plus Percocet 7.5/325 q.8 hours p.r.n. DISCHARGE CONDITION:  Satisfactory.       MD MULUGETA Handley / KULDIP  D: 01/05/2018 15:20     T: 01/05/2018 15:37  JOB #: [id number]

## 2018-01-05 NOTE — DISCHARGE INSTRUCTIONS
[facility]  [address], [facility], [address] [facility]  [phone]    Postoperative Home Instructions  Lumbar (Back) Surgery    · Showering: You may shower the first day you are home with the dressing on. If the dressing becomes saturated, change it completely to a similar dressing. Leave the steri-strips or glue in place. After 3 days, you may take the dressing off and leave it off. If you have the brown aquacel dressing, leave it alone for 5 days and then you may remove the dressing. Do not soak in a tub, and use only soap and water on the wound. · Wound Care: A small to moderate amount of reddish drainage on the dressing is normal the first 1-2 days after surgery. If the dressing becomes saturated, change it. Once the steri-strips begin to peel up on their own, you may gently take them off. Large amounts of clear, watery drainage is not normal and you should call our office immediately. · Signs of Infection: Extreme tenderness at the wound, excessive redness and/or swelling, or ugly yellowish-greenish drainage from the wound. Fever greater than 100.5 may be present. If you think you have a wound infection, call our office immediately. · Driving: You may not begin driving until after your visit to our office for a wound and suture check which is normally 7-10 days after you come home from the hospital.    · Medications: You should take anti-inflammatory medications such as Motrin, Celebrex for 30 days after surgery, every day, on a regular schedule only if prescribed by your physician. The pain medicine prescribed may be taken as needed. You should take a stool softener (Colace) twice a day, drink lots of water and eat high fiber foods to avoid constipation (this is a common problem with pain medicine.)    · Deep Breathing Exercises: Continue to do your incentive spirometry and/or deep breathing exercises to prevent risk of pneumonia.     · Smoking: YOU MAY NOT SMOKE! Smoking will interfere with your healing. If you smoke, you may end up with having another surgery or more problems! · Activity: No heavy lifting for 4 weeks after surgery. This means anything heavier than a coffee cup or newspaper. After 4 weeks, you may gradually begin lifting heavier things. Avoid bending, stooping, or twisting at the waist.  Do not lie on your stomach to sleep. · You may remove your Moises hose when consistently walking. You may do steps and inclines in moderation. · Sexual Relations: You may resume sexual relations 2 weeks after your surgery. · Walking Program: You should begin walking every day on the first day after surgery. Start for short distances, then go a little farther each day. You should eventually walk 1-2 miles every day for the long term. This is very important in your recovery period because walking strengthens the spinal muscles and will help protect your disc and vertebrae. · Symptoms after Surgery: Dont be alarmed if you still have some symptoms after surgery. The nerves often require time to heal after the pressure has been taken off. Be patient, you should see improvement with time. · Follow-up: You will need to call our office for an appointment to see a nurse one week after surgery for a wound check. An appointment will be made then for you to see your surgeon about 4 weeks after surgery. Please call our office if you have any other questions or problems. Listen to your body; it will tell you if you are overdoing it. Use common sense and take care of yourself!      After general anesthesia or intravenous sedation, for 24 hours or while taking prescription Narcotics:  · Limit your activities  · Do not drive and operate hazardous machinery  · Do not make important personal or business decisions  · Do  not drink alcoholic beverages  · If you have not urinated within 8 hours after discharge, please contact your surgeon on call.    *  Please give a list of your current medications to your Primary Care Provider. *  Please update this list whenever your medications are discontinued, doses are      changed, or new medications (including over-the-counter products) are added. *  Please carry medication information at all times in case of emergency situations. These are general instructions for a healthy lifestyle:  No smoking/ No tobacco products/ Avoid exposure to second hand smoke  Surgeon General's Warning:  Quitting smoking now greatly reduces serious risk to your health. Obesity, smoking, and sedentary lifestyle greatly increases your risk for illness  A healthy diet, regular physical exercise & weight monitoring are important for maintaining a healthy lifestyle    You may be retaining fluid if you have a history of heart failure or if you experience any of the following symptoms:  Weight gain of 3 pounds or more overnight or 5 pounds in a week, increased swelling in our hands or feet or shortness of breath while lying flat in bed. Please call your doctor as soon as you notice any of these symptoms; do not wait until your next office visit. Recognize signs and symptoms of STROKE:  F-face looks uneven  A-arms unable to move or move unevenly  S-speech slurred or non-existent  T-time-call 911 as soon as signs and symptoms begin-DO NOT go       Back to bed or wait to see if you get better-TIME IS BRAIN.

## 2018-07-31 PROBLEM — M54.50 ACUTE BILATERAL LOW BACK PAIN WITHOUT SCIATICA: Status: ACTIVE | Noted: 2018-07-31

## 2020-02-06 ENCOUNTER — HOSPITAL ENCOUNTER (OUTPATIENT)
Dept: CT IMAGING | Age: 25
Discharge: HOME | End: 2020-02-08
Payer: COMMERCIAL

## 2020-02-06 PROCEDURE — 6360000004 HC RX CONTRAST MEDICATION: Performed by: PHYSICIAN ASSISTANT

## 2020-02-06 PROCEDURE — 74170 CT ABD WO CNTRST FLWD CNTRST: CPT

## 2020-02-06 RX ADMIN — IOPAMIDOL 100 ML: 755 INJECTION, SOLUTION INTRAVENOUS at 10:16

## 2020-10-29 ENCOUNTER — HOSPITAL ENCOUNTER (OUTPATIENT)
Age: 25
Setting detail: SPECIMEN
Discharge: HOME | End: 2020-10-29
Payer: COMMERCIAL

## 2020-10-29 ENCOUNTER — OFFICE VISIT (OUTPATIENT)
Dept: OBGYN | Age: 25
End: 2020-10-29
Payer: COMMERCIAL

## 2020-10-29 VITALS
HEIGHT: 67 IN | BODY MASS INDEX: 35.79 KG/M2 | SYSTOLIC BLOOD PRESSURE: 138 MMHG | WEIGHT: 228 LBS | DIASTOLIC BLOOD PRESSURE: 80 MMHG

## 2020-10-29 LAB
BILIRUBIN, POC: NORMAL
BLOOD URINE, POC: NORMAL
CLARITY, POC: CLEAR
COLOR, POC: NORMAL
GLUCOSE URINE, POC: NORMAL
KETONES, POC: NORMAL
LEUKOCYTE EST, POC: NORMAL
NITRITE, POC: NORMAL
PH, POC: 7
PROTEIN, POC: NORMAL
SPECIFIC GRAVITY, POC: 1.02
UROBILINOGEN, POC: NORMAL

## 2020-10-29 PROCEDURE — 99203 OFFICE O/P NEW LOW 30 MIN: CPT | Performed by: ADVANCED PRACTICE MIDWIFE

## 2020-10-29 PROCEDURE — 81002 URINALYSIS NONAUTO W/O SCOPE: CPT | Performed by: ADVANCED PRACTICE MIDWIFE

## 2020-10-29 PROCEDURE — G0145 SCR C/V CYTO,THINLAYER,RESCR: HCPCS

## 2020-10-29 PROCEDURE — 87491 CHLMYD TRACH DNA AMP PROBE: CPT

## 2020-10-29 PROCEDURE — 87591 N.GONORRHOEAE DNA AMP PROB: CPT

## 2020-10-29 ASSESSMENT — PATIENT HEALTH QUESTIONNAIRE - PHQ9
2. FEELING DOWN, DEPRESSED OR HOPELESS: 1
SUM OF ALL RESPONSES TO PHQ QUESTIONS 1-9: 2
SUM OF ALL RESPONSES TO PHQ QUESTIONS 1-9: 2
1. LITTLE INTEREST OR PLEASURE IN DOING THINGS: 1
SUM OF ALL RESPONSES TO PHQ QUESTIONS 1-9: 2
SUM OF ALL RESPONSES TO PHQ9 QUESTIONS 1 & 2: 2

## 2020-10-29 NOTE — PROGRESS NOTES
PROBLEM VISIT     Date of service: 10/29/2020    Susie Garrison  Is a 26 y.o. single female    PT's PCP is: Eric Thomas MD     : 1995                                             Subjective:       Patient's last menstrual period was 10/02/2020 (approximate). OB History    Para Term  AB Living   0 0 0 0 0 0   SAB TAB Ectopic Molar Multiple Live Births   0 0 0 0 0 0        Social History     Tobacco Use   Smoking Status Never Smoker   Smokeless Tobacco Never Used        Social History     Substance and Sexual Activity   Alcohol Use Yes    Comment: social       Allergies: Patient has no known allergies. No current outpatient medications on file. Social History     Substance and Sexual Activity   Sexual Activity Yes    Partners: Female       Last Yearly:  na    Last pap: na    Last HPV: na    Chief Complaint   Patient presents with    Dysuria     Patient has h/o recurrent UTI, patient has had 3 UTI in approx. last 6 weeks. Has been on multiple antibiotic, Symptoms improve but then come back. Patient had adrenal gland removed  2020 benign findings.  Breast Problem     C/O right breast lump, non tender. PE:  Vital Signs  Blood pressure 138/80, height 5' 7\" (1.702 m), weight 228 lb (103.4 kg), last menstrual period 10/02/2020, not currently breastfeeding. Estimated body mass index is 35.71 kg/m² as calculated from the following:    Height as of this encounter: 5' 7\" (1.702 m). Weight as of this encounter: 228 lb (103.4 kg). NURSE: Manish LPTAYLER    HPI: patient here for breast lump but is time for pap test and has had several uti's with pelvic pain that needs addressed. Of note she thinks breast lump is smaller and \"felt like a lipoma\" she has had several lipomas removed from her back. Continues with frequent urination.      PT denies fever, chills, nausea and vomiting       Objective   No acute distress  Excellent communications  Well-nourished  Lymphatic: no lymphadenopathy  Heent:   negative        GI: Abdomen soft, non-tender. BS normal. No masses,  No organomegaly           Extremities: normal strength, tone, and muscle mass   Breasts: Breast:pendulous breasts, no skin changes or nipples discharge, right breast at 7 oclock with a superficial rubbery mobile, nontender lump approximately 1 1/2 cm   Pelvic Exam: GENITAL/URINARY:  External Genitalia:  General appearance; normal, Hair distribution; normal, Lesions absent  Urethral Meatus:  Size normal, Location normal, Lesions absent, Prolapse absent  Urethra: Fullness absent, Masses absent  Bladder:  Fullness absent, Masses absent, Tenderness absent, Cystocele absent  Vagina:  General appearance normal, Estrogen effect normal, Discharge absent, Lesions absent, Pelvic support normal  Cervix:  General appearance normal, Lesions absent, Discharge absent, Tenderness absent, Enlargement absent, Nodularity absent  Uterus:  Size normal, Tenderness absent  Adenexa: Masses absent, Tenderness absent  Anus/Perineum:  Lesions absent and Masses absent                                                    Results reviewed today:    No results found for this visit on 10/29/20. Assessment and Plan          Diagnosis Orders   1. Dysuria  Urinalysis With Microscopic    Culture, Urine    POCT Urinalysis no Micro    C.trachomatis N.gonorrhoeae DNA, Thin Prep   2. Screening for cervical cancer  PAP SMEAR   3. Breast lump on right side at 7 o'clock position  US Breast Limited Right       consider bladder us for pvr if negative      Timmy Scott does not currently have medications on file. No follow-ups on file. There are no Patient Instructions on file for this visit. Over 50% of time spent on counseling and care coordination on: see assessment and plan,  She was also counseled on her preventative health maintenance recommendations and follow-up.         FF time: 30 min      Betzaida Santos,10/29/2020 11:26 AM

## 2020-10-30 ENCOUNTER — HOSPITAL ENCOUNTER (OUTPATIENT)
Dept: ULTRASOUND IMAGING | Age: 25
Discharge: HOME | End: 2020-11-01
Payer: COMMERCIAL

## 2020-10-30 PROCEDURE — 76641 ULTRASOUND BREAST COMPLETE: CPT

## 2020-11-02 NOTE — RESULT ENCOUNTER NOTE
Pt. notified of results and voices understanding. Scheduled 02/03/2021 for exam with Margarito Coates CNM.

## 2020-11-04 LAB
CHLAMYDIA BY THIN PREP: NEGATIVE
N. GONORRHOEAE DNA, THIN PREP: NEGATIVE
SPECIMEN DESCRIPTION: NORMAL

## 2020-11-06 LAB — CYTOLOGY REPORT: NORMAL

## (undated) DEVICE — INTENDED FOR TISSUE SEPARATION, AND OTHER PROCEDURES THAT REQUIRE A SHARP SURGICAL BLADE TO PUNCTURE OR CUT.: Brand: BARD-PARKER SAFETY BLADES SIZE 15, STERILE

## (undated) DEVICE — AGENT HEMSTAT W3XL4IN OXIDIZED REGENERATED CELOS ABSRB FOR

## (undated) DEVICE — Z CONVERTED USE 2421973 CONTAINER SPEC 60/30ML 10% FRMLN POLYPR PREFIL

## (undated) DEVICE — SOLUTION IV 1000ML 0.9% SOD CHL

## (undated) DEVICE — (D)PREP SKN CHLRAPRP APPL 26ML -- CONVERT TO ITEM 371833

## (undated) DEVICE — 1010 S-DRAPE TOWEL DRAPE 10/BX: Brand: STERI-DRAPE™

## (undated) DEVICE — SUTURE VCRL VIO BR 0 18IN C/R M04 J701D

## (undated) DEVICE — REM POLYHESIVE ADULT PATIENT RETURN ELECTRODE: Brand: VALLEYLAB

## (undated) DEVICE — 2000CC GUARDIAN II: Brand: GUARDIAN

## (undated) DEVICE — WAX SURG 2.5GM HEMSTAT BNE BEESWAX PARAFFIN ISO PALMITATE

## (undated) DEVICE — (D)SEALANT HEMSTAT SURGIFLO -- DISC BY MFR  USE ITEM 300438

## (undated) DEVICE — SUTURE VCRL + SZ 3-0 L18IN ABSRB UD SH 1/2 CIR TAPERCUT NDL VCP864D

## (undated) DEVICE — SYR 10ML LUER LOK 1/5ML GRAD --

## (undated) DEVICE — BLADE ASSEMB CLP HAIR FINE --

## (undated) DEVICE — BIPOLAR SEALER 23-113-1 AQM 2.3 OM NEURO: Brand: AQUAMANTYS ®

## (undated) DEVICE — SURGIFOAM SPNG SZ 100

## (undated) DEVICE — GOWN,PREVENTION PLUS,2XL,ST,22/CS: Brand: MEDLINE

## (undated) DEVICE — 3M™ TEGADERM™ TRANSPARENT FILM DRESSING FRAME STYLE, 1628, 6 IN X 8 IN (15 CM X 20 CM), 10/CT 8CT/CASE: Brand: 3M™ TEGADERM™

## (undated) DEVICE — KENDALL SCD EXPRESS SLEEVES, KNEE LENGTH, MEDIUM: Brand: KENDALL SCD

## (undated) DEVICE — PACK PROCEDURE SURG POST LAMINECTOMY CDS

## (undated) DEVICE — STANDARD HYPODERMIC NEEDLE,POLYPROPYLENE HUB: Brand: MONOJECT